# Patient Record
Sex: MALE | Race: WHITE | NOT HISPANIC OR LATINO | Employment: OTHER | URBAN - METROPOLITAN AREA
[De-identification: names, ages, dates, MRNs, and addresses within clinical notes are randomized per-mention and may not be internally consistent; named-entity substitution may affect disease eponyms.]

---

## 2018-11-28 ENCOUNTER — HISTORICAL (OUTPATIENT)
Dept: RADIOLOGY | Facility: HOSPITAL | Age: 58
End: 2018-11-28

## 2019-01-21 ENCOUNTER — HISTORICAL (OUTPATIENT)
Dept: RADIOLOGY | Facility: HOSPITAL | Age: 59
End: 2019-01-21

## 2019-02-01 ENCOUNTER — HISTORICAL (OUTPATIENT)
Dept: RADIOLOGY | Facility: HOSPITAL | Age: 59
End: 2019-02-01

## 2019-02-06 LAB
ABS NEUT (OLG): 5.3 X10(3)/MCL (ref 1.5–6.9)
ALBUMIN SERPL-MCNC: 3.6 GM/DL (ref 3.4–5)
ALBUMIN/GLOB SERPL: 0.9 RATIO
ALP SERPL-CCNC: 78 UNIT/L (ref 30–113)
ALT SERPL-CCNC: 23 UNIT/L (ref 10–45)
APTT PPP: 30.8 SECOND(S) (ref 25–35)
AST SERPL-CCNC: 21 UNIT/L (ref 15–37)
BASOPHILS # BLD AUTO: 0.1 X10(3)/MCL (ref 0–0.1)
BASOPHILS NFR BLD AUTO: 2 % (ref 0–1)
BILIRUB SERPL-MCNC: 0.6 MG/DL (ref 0.1–0.9)
BILIRUBIN DIRECT+TOT PNL SERPL-MCNC: 0.1 MG/DL (ref 0–0.3)
BILIRUBIN DIRECT+TOT PNL SERPL-MCNC: 0.5 MG/DL
BUN SERPL-MCNC: 10 MG/DL (ref 10–20)
CALCIUM SERPL-MCNC: 9.4 MG/DL (ref 8–10.5)
CHLORIDE SERPL-SCNC: 99 MMOL/L (ref 100–108)
CO2 SERPL-SCNC: 32 MMOL/L (ref 21–35)
CREAT SERPL-MCNC: 0.77 MG/DL (ref 0.7–1.3)
EOSINOPHIL # BLD AUTO: 0.3 X10(3)/MCL (ref 0–0.6)
EOSINOPHIL NFR BLD AUTO: 4 % (ref 0–5)
ERYTHROCYTE [DISTWIDTH] IN BLOOD BY AUTOMATED COUNT: 13.2 % (ref 11.5–17)
GLOBULIN SER-MCNC: 3.9 GM/DL
GLUCOSE SERPL-MCNC: 87 MG/DL (ref 75–116)
HCT VFR BLD AUTO: 51.1 % (ref 42–52)
HGB BLD-MCNC: 16.3 GM/DL (ref 14–18)
IMM GRANULOCYTES # BLD AUTO: 0.04 10*3/UL (ref 0–0.02)
IMM GRANULOCYTES NFR BLD AUTO: 0.5 % (ref 0–0.43)
INR PPP: 0.9 (ref 0–1.2)
LYMPHOCYTES # BLD AUTO: 1.3 X10(3)/MCL (ref 0.5–4.1)
LYMPHOCYTES NFR BLD AUTO: 16 % (ref 15–40)
MCH RBC QN AUTO: 30 PG (ref 27–34)
MCHC RBC AUTO-ENTMCNC: 32 GM/DL (ref 31–36)
MCV RBC AUTO: 94 FL (ref 80–99)
MONOCYTES # BLD AUTO: 0.9 X10(3)/MCL (ref 0–1.1)
MONOCYTES NFR BLD AUTO: 12 % (ref 4–12)
NEUTROPHILS # BLD AUTO: 5.3 X10(3)/MCL (ref 1.5–6.9)
NEUTROPHILS NFR BLD AUTO: 66 % (ref 43–75)
PLATELET # BLD AUTO: 224 X10(3)/MCL (ref 140–400)
PMV BLD AUTO: 10 FL (ref 6.8–10)
POTASSIUM SERPL-SCNC: 4.1 MMOL/L (ref 3.6–5.2)
PROT SERPL-MCNC: 7.5 GM/DL (ref 6.4–8.2)
PROTHROMBIN TIME: 9.5 SECOND(S) (ref 9–12)
RBC # BLD AUTO: 5.41 X10(6)/MCL (ref 4.7–6.1)
SODIUM SERPL-SCNC: 137 MMOL/L (ref 135–145)
WBC # SPEC AUTO: 8 X10(3)/MCL (ref 4.5–11.5)

## 2019-02-11 ENCOUNTER — HISTORICAL (OUTPATIENT)
Dept: ANESTHESIOLOGY | Facility: HOSPITAL | Age: 59
End: 2019-02-11

## 2019-03-18 ENCOUNTER — HISTORICAL (OUTPATIENT)
Dept: ANESTHESIOLOGY | Facility: HOSPITAL | Age: 59
End: 2019-03-18

## 2019-04-17 ENCOUNTER — HISTORICAL (OUTPATIENT)
Dept: RADIOLOGY | Facility: HOSPITAL | Age: 59
End: 2019-04-17

## 2019-05-06 ENCOUNTER — HOSPITAL ENCOUNTER (OUTPATIENT)
Dept: MEDSURG UNIT | Facility: HOSPITAL | Age: 59
End: 2019-05-07
Attending: INTERNAL MEDICINE | Admitting: INTERNAL MEDICINE

## 2019-05-06 LAB
ABS NEUT (OLG): 2.3 X10(3)/MCL (ref 1.5–6.9)
ALBUMIN SERPL-MCNC: 3.7 GM/DL (ref 3.4–5)
ALBUMIN/GLOB SERPL: 0.9 RATIO
ALP SERPL-CCNC: 72 UNIT/L (ref 30–113)
ALT SERPL-CCNC: 32 UNIT/L (ref 10–45)
AMPHET UR QL SCN: NORMAL
APPEARANCE, UA: CLEAR
APTT PPP: 31.5 SECOND(S) (ref 25–35)
AST SERPL-CCNC: 40 UNIT/L (ref 15–37)
BACTERIA SPEC CULT: NORMAL /HPF
BARBITURATE SCN PRESENT UR: NORMAL
BASOPHILS # BLD AUTO: 0.1 X10(3)/MCL (ref 0–0.1)
BASOPHILS NFR BLD AUTO: 2 % (ref 0–1)
BENZODIAZ UR QL SCN: NORMAL
BILIRUB SERPL-MCNC: 0.4 MG/DL (ref 0.1–0.9)
BILIRUB UR QL STRIP: NEGATIVE
BILIRUBIN DIRECT+TOT PNL SERPL-MCNC: 0.1 MG/DL (ref 0–0.3)
BILIRUBIN DIRECT+TOT PNL SERPL-MCNC: 0.3 MG/DL
BUN SERPL-MCNC: 8 MG/DL (ref 10–20)
CALCIUM SERPL-MCNC: 8.1 MG/DL (ref 8–10.5)
CANNABINOIDS UR QL SCN: NORMAL
CHLORIDE SERPL-SCNC: 89 MMOL/L (ref 100–108)
CK MB SERPL-MCNC: 3.9 NG/ML (ref 0–3.6)
CK SERPL-CCNC: 208 UNIT/L (ref 39–225)
CO2 SERPL-SCNC: 30 MMOL/L (ref 21–35)
COCAINE UR QL SCN: NORMAL
COLOR UR: ABNORMAL
CREAT SERPL-MCNC: 0.54 MG/DL (ref 0.7–1.3)
EOSINOPHIL # BLD AUTO: 0.1 X10(3)/MCL (ref 0–0.6)
EOSINOPHIL NFR BLD AUTO: 2 % (ref 0–5)
ERYTHROCYTE [DISTWIDTH] IN BLOOD BY AUTOMATED COUNT: 12.8 % (ref 11.5–17)
GLOBULIN SER-MCNC: 3.9 GM/DL
GLUCOSE (UA): NEGATIVE
GLUCOSE SERPL-MCNC: 89 MG/DL (ref 75–116)
HCO3 UR-SCNC: 31 MMOL/L (ref 22–26)
HCO3 UR-SCNC: 31 MMOL/L (ref 22–26)
HCT VFR BLD AUTO: 48.2 % (ref 42–52)
HGB BLD-MCNC: 16.8 GM/DL (ref 14–18)
HGB UR QL STRIP: ABNORMAL
IMM GRANULOCYTES # BLD AUTO: 0.04 10*3/UL (ref 0–0.02)
IMM GRANULOCYTES NFR BLD AUTO: 0.9 % (ref 0–0.43)
INR PPP: 1 (ref 0–1.2)
KETONES UR QL STRIP: ABNORMAL
LACTATE SERPL-SCNC: 2 MMOL/L (ref 0.4–2)
LEUKOCYTE ESTERASE UR QL STRIP: NEGATIVE
LYMPHOCYTES # BLD AUTO: 1.4 X10(3)/MCL (ref 0.5–4.1)
LYMPHOCYTES NFR BLD AUTO: 33 % (ref 15–40)
MAGNESIUM SERPL-MCNC: 1.9 MG/DL (ref 1.8–2.4)
MCH RBC QN AUTO: 31 PG (ref 27–34)
MCHC RBC AUTO-ENTMCNC: 35 GM/DL (ref 31–36)
MCV RBC AUTO: 88 FL (ref 80–99)
MDMA UR QL SCN: NORMAL
METHADONE UR QL SCN: NORMAL
MONOCYTES # BLD AUTO: 0.5 X10(3)/MCL (ref 0–1.1)
MONOCYTES NFR BLD AUTO: 11 % (ref 4–12)
NEUTROPHILS # BLD AUTO: 2.3 X10(3)/MCL (ref 1.5–6.9)
NEUTROPHILS NFR BLD AUTO: 52 % (ref 43–75)
NITRITE UR QL STRIP: NEGATIVE
OPIATES UR QL SCN: NORMAL
PCO2 BLDA: 58.6 MMHG (ref 35–45)
PCO2 BLDA: 58.6 MMHG (ref 35–45)
PCP UR QL: NORMAL
PH SMN: 7.33 [PH] (ref 7.35–7.45)
PH SMN: 7.33 [PH] (ref 7.35–7.45)
PH UR STRIP.AUTO: 6 [PH] (ref 5–8)
PH UR STRIP: 6 [PH]
PLATELET # BLD AUTO: 184 X10(3)/MCL (ref 140–400)
PMV BLD AUTO: 9.1 FL (ref 6.8–10)
PO2 BLDA: 68 MMHG (ref 80–100)
PO2 BLDA: 68 MMHG (ref 80–100)
POC ALLENS TEST: POSITIVE
POC BE: 5 (ref -2–3)
POC BE: 5 MMOL/L (ref -2–3)
POC CO2: 33 MMOL/L (ref 22–27)
POC SATURATED O2: 91 % (ref 96–97)
POC SATURATED O2: 91 MMHG (ref 96–97)
POC SITE: ABNORMAL
POC TCO2: 33 MMOL/L (ref 24–29)
POC TREATMENT: ABNORMAL
POTASSIUM SERPL-SCNC: 4.7 MMOL/L (ref 3.6–5.2)
PROT SERPL-MCNC: 7.6 GM/DL (ref 6.4–8.2)
PROT UR QL STRIP: ABNORMAL
PROTHROMBIN TIME: 10.4 SECOND(S) (ref 9–12)
RBC # BLD AUTO: 5.46 X10(6)/MCL (ref 4.7–6.1)
RBC #/AREA URNS HPF: NORMAL /HPF
SODIUM SERPL-SCNC: 127 MMOL/L (ref 135–145)
SP GR UR STRIP: <=1.005
SQUAMOUS EPITHELIAL, UA: NORMAL /LPF
TEMPERATURE, URINE (OHS): 23 DEGC (ref 20–25)
TROPONIN I SERPL-MCNC: 0.04 NG/ML (ref 0–0.06)
UROBILINOGEN UR STRIP-ACNC: 0.2 EU/DL
WBC # SPEC AUTO: 4.3 X10(3)/MCL (ref 4.5–11.5)
WBC #/AREA URNS HPF: NORMAL /[HPF]

## 2019-05-07 LAB
CK MB SERPL-MCNC: 3 NG/ML (ref 0–3.6)
CK MB SERPL-MCNC: 3.2 NG/ML (ref 0–3.6)
CK SERPL-CCNC: 140 UNIT/L (ref 39–225)
CK SERPL-CCNC: 154 UNIT/L (ref 39–225)
TROPONIN I SERPL-MCNC: <0.017 NG/ML (ref 0–0.06)
TROPONIN I SERPL-MCNC: <0.017 NG/ML (ref 0–0.06)

## 2019-05-31 ENCOUNTER — HISTORICAL (OUTPATIENT)
Dept: RADIOLOGY | Facility: HOSPITAL | Age: 59
End: 2019-05-31

## 2019-07-29 ENCOUNTER — HISTORICAL (OUTPATIENT)
Dept: RADIOLOGY | Facility: HOSPITAL | Age: 59
End: 2019-07-29

## 2019-08-19 ENCOUNTER — HISTORICAL (OUTPATIENT)
Dept: LAB | Facility: HOSPITAL | Age: 59
End: 2019-08-19

## 2019-08-19 LAB
CHOLEST SERPL-MCNC: 142 MG/DL (ref 140–200)
CHOLEST/HDLC SERPL: 3 MG/DL (ref 0–8)
HDLC SERPL-MCNC: 55 MG/DL (ref 35–59)
LDLC SERPL CALC-MCNC: 81 MG/DL (ref 100–129)
TRIGL SERPL-MCNC: 61 MG/DL (ref 35–150)
VLDLC SERPL CALC-MCNC: 12 MG/DL

## 2019-11-18 ENCOUNTER — HISTORICAL (OUTPATIENT)
Dept: LAB | Facility: HOSPITAL | Age: 59
End: 2019-11-18

## 2019-11-18 LAB
ALBUMIN SERPL-MCNC: 3.6 GM/DL (ref 3.4–5)
ALBUMIN/GLOB SERPL: 1 RATIO
ALP SERPL-CCNC: 82 UNIT/L (ref 30–113)
ALT SERPL-CCNC: 22 UNIT/L (ref 10–45)
AST SERPL-CCNC: 19 UNIT/L (ref 15–37)
BILIRUB SERPL-MCNC: 0.6 MG/DL (ref 0.1–0.9)
BILIRUBIN DIRECT+TOT PNL SERPL-MCNC: 0.1 MG/DL (ref 0–0.3)
BILIRUBIN DIRECT+TOT PNL SERPL-MCNC: 0.5 MG/DL
BUN SERPL-MCNC: 13 MG/DL (ref 10–20)
CALCIUM SERPL-MCNC: 8.8 MG/DL (ref 8–10.5)
CHLORIDE SERPL-SCNC: 103 MMOL/L (ref 100–108)
CHOLEST SERPL-MCNC: 234 MG/DL (ref 140–200)
CHOLEST/HDLC SERPL: 5 MG/DL (ref 0–8)
CO2 SERPL-SCNC: 34 MMOL/L (ref 21–35)
CREAT SERPL-MCNC: 0.8 MG/DL (ref 0.7–1.3)
GLOBULIN SER-MCNC: 3.7 GM/DL
GLUCOSE SERPL-MCNC: 96 MG/DL (ref 75–116)
HDLC SERPL-MCNC: 48 MG/DL (ref 35–59)
LDLC SERPL CALC-MCNC: 168 MG/DL (ref 100–129)
POTASSIUM SERPL-SCNC: 4.3 MMOL/L (ref 3.6–5.2)
PROT SERPL-MCNC: 7.3 GM/DL (ref 6.4–8.2)
SODIUM SERPL-SCNC: 141 MMOL/L (ref 135–145)
TRIGL SERPL-MCNC: 119 MG/DL (ref 35–150)
VLDLC SERPL CALC-MCNC: 24 MG/DL

## 2020-01-09 ENCOUNTER — HISTORICAL (OUTPATIENT)
Dept: LAB | Facility: HOSPITAL | Age: 60
End: 2020-01-09

## 2020-01-09 LAB
ABS NEUT (OLG): 4.4 X10(3)/MCL (ref 1.5–6.9)
ALBUMIN SERPL-MCNC: 3.5 GM/DL (ref 3.4–5)
ALBUMIN/GLOB SERPL: 1 RATIO
ALP SERPL-CCNC: 78 UNIT/L (ref 30–113)
ALT SERPL-CCNC: 34 UNIT/L (ref 10–45)
APPEARANCE, UA: CLEAR
AST SERPL-CCNC: 30 UNIT/L (ref 15–37)
BACTERIA SPEC CULT: NORMAL /HPF
BILIRUB SERPL-MCNC: 0.4 MG/DL (ref 0.1–0.9)
BILIRUB UR QL STRIP: NEGATIVE
BILIRUBIN DIRECT+TOT PNL SERPL-MCNC: 0.1 MG/DL (ref 0–0.3)
BILIRUBIN DIRECT+TOT PNL SERPL-MCNC: 0.3 MG/DL
BUN SERPL-MCNC: 5 MG/DL (ref 10–20)
CALCIUM SERPL-MCNC: 8.5 MG/DL (ref 8–10.5)
CHLORIDE SERPL-SCNC: 101 MMOL/L (ref 100–108)
CHOLEST SERPL-MCNC: 116 MG/DL (ref 140–200)
CHOLEST/HDLC SERPL: 2 MG/DL (ref 0–8)
CO2 SERPL-SCNC: 32 MMOL/L (ref 21–35)
COLOR UR: ABNORMAL
CREAT SERPL-MCNC: 0.65 MG/DL (ref 0.7–1.3)
DEPRECATED CALCIDIOL+CALCIFEROL SERPL-MC: 17.67 NG/ML (ref 30–80)
ERYTHROCYTE [DISTWIDTH] IN BLOOD BY AUTOMATED COUNT: 12.9 % (ref 11.5–17)
GLOBULIN SER-MCNC: 3.6 GM/DL
GLUCOSE (UA): NEGATIVE
GLUCOSE SERPL-MCNC: 80 MG/DL (ref 75–116)
HCT VFR BLD AUTO: 50.1 % (ref 42–52)
HDLC SERPL-MCNC: 50 MG/DL (ref 35–59)
HGB BLD-MCNC: 16.7 GM/DL (ref 14–18)
HGB UR QL STRIP: ABNORMAL
KETONES UR QL STRIP: NEGATIVE
LDLC SERPL CALC-MCNC: 56 MG/DL (ref 100–129)
LEUKOCYTE ESTERASE UR QL STRIP: ABNORMAL
MCH RBC QN AUTO: 31 PG (ref 27–34)
MCHC RBC AUTO-ENTMCNC: 33 GM/DL (ref 31–36)
MCV RBC AUTO: 93 FL (ref 80–99)
NITRITE UR QL STRIP: NEGATIVE
PH UR STRIP: 6 [PH]
PLATELET # BLD AUTO: 273 X10(3)/MCL (ref 140–400)
PMV BLD AUTO: 9.5 FL (ref 6.8–10)
POTASSIUM SERPL-SCNC: 4.5 MMOL/L (ref 3.6–5.2)
PROT SERPL-MCNC: 7.1 GM/DL (ref 6.4–8.2)
PROT UR QL STRIP: NEGATIVE
PSA SERPL-MCNC: 4.7 NG/ML (ref 0–4)
RBC # BLD AUTO: 5.38 X10(6)/MCL (ref 4.7–6.1)
RBC #/AREA URNS HPF: NORMAL /HPF
SODIUM SERPL-SCNC: 139 MMOL/L (ref 135–145)
SP GR UR STRIP: <=1.005
SQUAMOUS EPITHELIAL, UA: NORMAL /LPF
TRIGL SERPL-MCNC: 72 MG/DL (ref 35–150)
TSH SERPL-ACNC: 1.16 MIU/ML (ref 0.36–3.74)
UROBILINOGEN UR STRIP-ACNC: 0.2 EU/DL
VLDLC SERPL CALC-MCNC: 14 MG/DL
WBC # SPEC AUTO: 6.8 X10(3)/MCL (ref 4.5–11.5)
WBC #/AREA URNS HPF: NORMAL /HPF

## 2020-01-11 LAB — FINAL CULTURE: NO GROWTH

## 2020-01-13 LAB
HEMOCCULT SP1 STL QL: NEGATIVE
HEMOCCULT SP2 STL QL: NEGATIVE

## 2020-02-28 ENCOUNTER — HISTORICAL (OUTPATIENT)
Dept: LAB | Facility: HOSPITAL | Age: 60
End: 2020-02-28

## 2020-02-28 LAB
ALBUMIN SERPL-MCNC: 3.1 GM/DL (ref 3.4–5)
ALBUMIN/GLOB SERPL: 0.8 RATIO
ALP SERPL-CCNC: 67 UNIT/L (ref 30–113)
ALT SERPL-CCNC: 28 UNIT/L (ref 10–45)
AST SERPL-CCNC: 24 UNIT/L (ref 15–37)
BILIRUB SERPL-MCNC: 0.4 MG/DL (ref 0.1–0.9)
BILIRUBIN DIRECT+TOT PNL SERPL-MCNC: 0.1 MG/DL (ref 0–0.3)
BILIRUBIN DIRECT+TOT PNL SERPL-MCNC: 0.3 MG/DL
BUN SERPL-MCNC: 11 MG/DL (ref 10–20)
CALCIUM SERPL-MCNC: 8.7 MG/DL (ref 8–10.5)
CHLORIDE SERPL-SCNC: 96 MMOL/L (ref 100–108)
CHOLEST SERPL-MCNC: 153 MG/DL (ref 140–200)
CHOLEST/HDLC SERPL: 4 MG/DL (ref 0–8)
CO2 SERPL-SCNC: 29 MMOL/L (ref 21–35)
CREAT SERPL-MCNC: 0.87 MG/DL (ref 0.7–1.3)
GLOBULIN SER-MCNC: 4.1 GM/DL
GLUCOSE SERPL-MCNC: 91 MG/DL (ref 75–116)
HDLC SERPL-MCNC: 35 MG/DL (ref 35–59)
LDLC SERPL CALC-MCNC: 93 MG/DL (ref 100–129)
POTASSIUM SERPL-SCNC: 3.9 MMOL/L (ref 3.6–5.2)
PROT SERPL-MCNC: 7.2 GM/DL (ref 6.4–8.2)
SODIUM SERPL-SCNC: 135 MMOL/L (ref 135–145)
TRIGL SERPL-MCNC: 75 MG/DL (ref 35–150)
VLDLC SERPL CALC-MCNC: 15 MG/DL

## 2020-08-04 ENCOUNTER — HISTORICAL (OUTPATIENT)
Dept: LAB | Facility: HOSPITAL | Age: 60
End: 2020-08-04

## 2020-08-04 LAB — PSA SERPL-MCNC: 3.11 NG/ML

## 2021-04-28 ENCOUNTER — HISTORICAL (OUTPATIENT)
Dept: RADIOLOGY | Facility: HOSPITAL | Age: 61
End: 2021-04-28

## 2021-09-22 ENCOUNTER — HISTORICAL (OUTPATIENT)
Dept: LAB | Facility: HOSPITAL | Age: 61
End: 2021-09-22

## 2021-09-22 LAB — PSA SERPL-MCNC: 2.62 NG/ML

## 2022-04-10 ENCOUNTER — HISTORICAL (OUTPATIENT)
Dept: ADMINISTRATIVE | Facility: HOSPITAL | Age: 62
End: 2022-04-10

## 2022-04-28 VITALS
DIASTOLIC BLOOD PRESSURE: 94 MMHG | SYSTOLIC BLOOD PRESSURE: 144 MMHG | OXYGEN SATURATION: 96 % | BODY MASS INDEX: 24.27 KG/M2 | HEIGHT: 63 IN | WEIGHT: 137 LBS

## 2022-04-30 NOTE — OP NOTE
ADMITTING DIAGNOSIS:  Dysphagia of solid food for about a year with associated weight loss.    PROCEDURE:  Esophagogastroduodenoscopy with biopsy of the antrum.    POSTOPERATIVE DIAGNOSES:    1. Normal duodenum in all 4 portions and into the jejunum.  2. Normal antrum, fundus, and cardia of the stomach with some gastritis.  Biopsy taken with the cold biopsy forceps.  3. Z-line at 40 cm with a mild gastroesophageal junction stricture that may be the source of his dysphagia.  4. Normal esophagus, cricopharyngeus muscle, and vocal cords.    BRIEF HISTORY:  The patient is a 59-year-old  male with COPD on an inhaler, also has osteoarthritis and smokes 2 packs a day for about 35 years.  The patient had solid-food dysphagia for the last year.  The patient was referred to me for endoscopic evaluation of his upper gastrointestinal tract because of his dysphagia.    DESCRIPTION OF PROCEDURE:  The patient underwent examination of esophagus, stomach, and duodenum with a flexible Olympus scope.  The duodenum was normal in all 4 portions into the jejunum.  The pylorus was intubated without difficulty.  Antrum, fundus, and cardia of the stomach had some gastritis.  There was a Z-line at 40 cm and a mild stricture at the GE junction that may be responsible for his dysphagia-like symptoms of solid food.  The patient had a normal esophagus, cricopharyngeus muscle, and vocal cords my.     My plan will be to discuss with him possible EGD and dilation of the distal esophageal stricture.  The patient will undergo further workup with ultrasound of the abdomen, upper GI with small bowel follow-through, and further evaluation of his vocal cords with Dr. Lockhart __________.       I will check for fungal viral aspergillosis and TB, as well as CMV and RSV via pulmonary clinic and Dr. Reza López.  I appreciate the consultation referral from Dr. Sanders.        BBS/MODL   DD: 02/11/2019 1243   DT: 02/11/2019 1310  Job #  [id number]    cc: Dr. Sanders

## 2022-04-30 NOTE — H&P
Patient:   Andrea Vanessa             MRN: [id number]            FIN: [id number]               Age:   59 years     Sex:  Male     :  1960   Associated Diagnoses:   Chest pain; COPD exacerbation; Tobacco user   Author:   Ana SAVAGE, Tray      Basic Information   Admit information:  SOB with cough CP and wheezing with h/o copd .    Source of history:  Self.    Referral source:  Self.    History limitation:  None.       Chief Complaint   2019 20:43 CDT       Pt states midline chest pain/pressure that began 1 hour PTA. [facility] states room air sat 85% on arrival. 324 asprin and 2 sprays nitro given in route.        Review of Systems   Has per HPI otherwise all systems reviewed and negative.   Constitutional:  Negative.    Eye:  Negative.    Ear/Nose/Mouth/Throat:  Negative.    Respiratory:  Shortness of breath, Cough, Wheezing.    Cardiovascular:       Chest pain: Bilateral.    Gastrointestinal:  Negative.    Genitourinary:  Negative.    Hematology/Lymphatics:  Negative.    Endocrine:  Negative.    Immunologic:  Negative.    Musculoskeletal:  Negative.    Integumentary:  Negative.    Neurologic:  Negative.    Psychiatric:  Negative.    All other systems are negative      Health Status   Allergies:    Allergic Reactions (All)  No Known Allergies,    Allergies (1) Active Reaction  No Known Allergies None Documented   Current medications:  (Selected)   Inpatient Medications  Ordered  DuoNeb 0.5 mg-2.5 mg/3 mL inhalation solution: 3 mL, form: Soln, NEB, q6hr Resp, first dose 19 1:00:00 CDT  ProAir HFA 90 mcg/inh inhalation aerosol with adapter: 1 puff(s), 90 mcg =, form: Inhaler, INH, q6hr PRN for wheezing, first dose 19 0:34:00 CDT  SOLU-Medrol: 60 mg, form: Injection, IV Push, [id number], first dose 19 1:00:00 CDT  Zofran: 4 mg, form: Injection, IV Push, q4hr PRN for nausea, first dose 19 0:07:00 CDT, choose first if ordered with other treatments for nausea  acetaminophen: 1,000  mg, form: Tab, Oral, q6hr PRN for pain, first dose 05/07/19 0:07:00 CDT, mild/moderate  acetaminophen: 650 mg, form: Tab, Oral, q6hr PRN for fever, first dose 05/07/19 0:07:00 CDT, > 38.1 degrees Celsius  albuterol-ipratropium MDI inhalation aerosol with adapter: 2 puff(s), form: Inhaler, INH, QID, first dose 05/07/19 0:34:00 CDT  enalapril-hydrochlorothiazide 10 mg-25 mg oral tablet: 1 tab(s), form: Tab, Oral, Daily, first dose 05/07/19 0:35:00 CDT  famotidine 20 mg oral tablet: 20 mg, form: Tab, Oral, BID, first dose 05/07/19 9:00:00 CDT  levofloxacin IVPB ( Levaquin ): 500 mg, form: Infusion, IV, q24hr, Infuse over: 1 hr, first dose 05/07/19 1:00:00 CDT  Prescriptions  Prescribed  ProAir HFA 90 mcg/inh inhalation aerosol with adapter: 1 puff(s), INH, q6hr, PRN PRN for wheezing, # 1 EA, 0 Refill(s)  Documented Medications  Documented  ADVAIR DISKU /50: 1 puff(s), INH, Q AM  famotidine 20 mg oral tablet:   hydrochlorothiazide-lisinopril 12.5 mg-10 mg oral tablet: 1 tab(s), Oral, Daily  omeprazole 40 mg oral DR capsule: 40 mg = 1 cap(s), Oral, Daily  sucralfate 1 g oral tablet: 1 gm = 1 tab(s), Oral, TID,    Medications (10) Active  Scheduled: (6)  albuterol-ipratropium  2 puff(s), INH, QID  albuterol-ipratropium 3mg-0.5 mg/3 mL Sol  3 mL, NEB, q6hr Resp  enalapril-hydrochlorothiazide  1 tab(s), Oral, Daily  famotidine 20 mg Tab UD  20 mg 1 tab(s), Oral, BID  levofloxacin 500 mg/100 mL  500 mg 100 mL, IV, q24hr  methylPREDNISolone 40 mg Inj (for Solu Medrol)  60 mg 1.5 mL, IV Push, [id number]  Continuous: (0)  PRN: (4)  acetaminophen 325 mg Tab  650 mg 2 tab(s), Oral, q6hr  acetaminophen 500 mg Tab  1,000 mg 2 tab(s), Oral, q6hr  albuterol CFC free 90 mcg/inh Aero  90 mcg 1 puff(s), INH, q6hr  ondansetron 2 mg/mL inj - 2mL  4 mg 2 mL, IV Push, q4hr   Problem list:    All Problems  Tobacco user / SNOMED CT 743324005 / Probable,    Active Problems (1)  Tobacco user       Histories   Past Medical History:     Resolved  COPD (chronic obstructive pulmonary disease) ([id number]):  Resolved.   Family History:    Heart disease  Father  Diabetes mellitus type 2  Mother  Acute myocardial infarction.  Father  Mother     Procedure history:    EGD & Esophegeal Dilation (None) on 3/18/2019 at 59 Years.  Comments:  3/18/2019 16:09 - Opal Silva  auto-populated from documented surgical case  Biopsy Gastrointestinal on 3/18/2019 at 59 Years.  Comments:  3/18/2019 16:09 - Opal Silva  auto-populated from documented surgical case  Esophagogastroduodenoscopy on 2/11/2019 at 59 Years.  Comments:  2/11/2019 12:41 - Opal Silva  auto-populated from documented surgical case  Biopsy Gastrointestinal on 2/11/2019 at 59 Years.  Comments:  2/11/2019 12:41 - Opal Silva  auto-populated from documented surgical case  Bilat Ears.  Right hand ([id number]).  Tonsils and adenoids ([id number]).   Social History        Social & Psychosocial Habits    Alcohol  02/06/2019  Use: Current    Type: Beer    Frequency: 1-2 times per week    Employment/School  02/06/2019  Description: Disabled    Home/Environment  02/06/2019  Lives with: Alone    Sexual  02/06/2019  Do you think of your sexual orientation as: Straight or heterosexual    Substance Abuse  03/10/2017  Use: Never    Tobacco  05/06/2019  Use: 10 or more cigarettes (1/    Type: Cigarettes    Patient Wants Consult For Cessation Counseling No    Tobacco use per day: 20.        Physical Examination   Vital Signs   5/7/2019 0:00 CDT        Temperature Oral          36.5 DegC                             Temperature Oral (calculated)             97.70 DegF                             Heart Rate Monitored      79 bpm                             Respiratory Rate          20 br/min                             SpO2                      95 %                             Systolic Blood Pressure   157 mmHg  HI                             Diastolic Blood Pressure   85 mmHg                             Mean Arterial Pressure, Cuff              109 mmHg    5/6/2019 23:42 CDT       Heart Rate Monitored      81 bpm                             Respiratory Rate          20 br/min                             SpO2                      92 %  LOW                             Oxygen Therapy            Nasal cannula                             Oxygen Flow Rate          3 L/min                             Systolic Blood Pressure   134 mmHg                             Diastolic Blood Pressure  89 mmHg    5/6/2019 23:25 CDT       Heart Rate Monitored      76 bpm                             Respiratory Rate          18 br/min                             SpO2                      92 %  LOW                             Oxygen Therapy            Nasal cannula                             Oxygen Flow Rate          3 L/min                             Systolic Blood Pressure   135 mmHg                             Diastolic Blood Pressure  76 mmHg                             Mean Arterial Pressure, Cuff              96 mmHg    5/6/2019 23:19 CDT       Heart Rate Monitored      75 bpm                             Respiratory Rate          14 br/min                             SpO2                      93 %  LOW                             Oxygen Therapy            Nasal cannula                             Oxygen Flow Rate          2 L/min    5/6/2019 22:45 CDT       Heart Rate Monitored      80 bpm                             Respiratory Rate          18 br/min                             SpO2                      92 %  LOW                             Oxygen Therapy            Nasal cannula                             Systolic Blood Pressure   122 mmHg                             Diastolic Blood Pressure  86 mmHg    5/6/2019 21:33 CDT       Heart Rate Monitored      98 bpm                             Respiratory Rate          22 br/min                             SpO2                      92 %  LOW                              Oxygen Therapy            Nasal cannula                             Oxygen Flow Rate          2 L/min                             Systolic Blood Pressure   135 mmHg                             Diastolic Blood Pressure  82 mmHg    5/6/2019 21:00 CDT       Heart Rate Monitored      93 bpm                             Respiratory Rate          24 br/min                             SpO2                      92 %  LOW    5/6/2019 20:54 CDT       Peripheral Pulse Rate     101 bpm  HI                             Respiratory Rate          24 br/min                             SpO2                      92 %  LOW                             Oxygen Therapy            Nasal cannula                             Oxygen Flow Rate          2 L/min                             Systolic Blood Pressure   143 mmHg  HI                             Diastolic Blood Pressure  111 mmHg  HI                             Mean Arterial Pressure, Cuff              122 mmHg    5/6/2019 20:50 CDT       Heart Rate Monitored      97 bpm                             Respiratory Rate          22 br/min                             SpO2                      91 %  LOW                             Oxygen Therapy            Nasal cannula                             Oxygen Flow Rate          2 L/min    5/6/2019 20:43 CDT       Temperature Temporal Artery               36.1 DegC  LOW                             Peripheral Pulse Rate     106 bpm  HI                             Respiratory Rate          24 br/min                             SpO2                      92 %  LOW                             Oxygen Therapy            Nasal cannula                             Oxygen Flow Rate          2 L/min                             Systolic Blood Pressure   144 mmHg  HI                             Diastolic Blood Pressure  103 mmHg  HI        Vital Signs (last 24 hrs)_____  Last Charted___________  Temp Oral     36.5 DegC  (MAY 07 00:00)  Heart  Rate Peripheral   H 101bpm  (MAY 06 20:54)  Resp Rate         20 br/min  (MAY 07 00:00)  SBP      H 157mmHg  (MAY 07 00:00)  DBP      85 mmHg  (MAY 07 00:00)  SpO2      95 %  (MAY 07 00:00)  Weight      65 kg  (MAY 06 20:43)  Height      170 cm  (MAY 06 20:43)  BMI      22.49  (MAY 06 20:43)   Intake and Output   Fluid Balance Primitives   3/18/2019 17:27 CDT      Urine Count               1    2/11/2019 7:00 CST       Oral Intake               360 mL                             Urine Count               1        General:  Alert and oriented, No acute distress.    Eye:  Pupils are equal, round and reactive to light, Normal conjunctiva.    HENT:  Normocephalic, Normal hearing, Oral mucosa is moist.    Neck:  Supple, Non-tender, No carotid bruit.    Respiratory:  Lungs are clear to auscultation, Respirations are non-labored, Breath sounds are equal.    Cardiovascular:  Normal rate, Regular rhythm, No murmur.    Gastrointestinal:  Soft, Non-tender, Non-distended, Normal bowel sounds.    Genitourinary:  No costovertebral angle tenderness.    Lymphatics:  No lymphadenopathy neck, axilla, groin.    Musculoskeletal:  Normal range of motion, Normal strength, No tenderness, No swelling.    Integumentary:  Warm, Dry, Intact.    Neurologic:  Alert, Oriented, Normal sensory, No focal deficits.    Cognition and Speech:  Oriented, Speech clear and coherent, Functional cognition intact.    Psychiatric:  Cooperative, Appropriate mood & affect, Normal judgment.       Review / Management   Laboratory Results   Today's Lab Results : PowerNote Discrete Results   5/6/2019 21:16 CDT       POC pH                    7.332  LOW                             POC pCO2                  58.6 mmHg  CRIT                             POC pO2                   68.0 mmHg  LOW                             POC HCO3                  31.0 mmol/L  HI                             POC TCO2                  33.0 mmol/L  HI                             POC sO2                    91.0 %  LOW                             POC BE                    5 mmol/L  HI    5/6/2019 21:05 CDT       Treatment                 2 lpm nc                             Site                      Radial Rt                             pH Art                    7.332  LOW                             pCO2 Art                  58.6 mmHg  CRIT                             pO2 Art                   68.0 mmHg  LOW                             HCO3 Art                  31.0 mmol/L  HI                             CO2 Totl Art              33.0 mmol/L  HI                             O2 Sat Art                91.0 mmHg  LOW                             D base                    5.0  HI                             Allens                    Positive    5/6/2019 21:00 CDT       Lactic Acid Lvl           2.0 mmol/L    5/6/2019 20:48 CDT       WBC                       4.3 x10(3)/mcL  LOW                             RBC                       5.46 x10(6)/mcL                             Hgb                       16.8 gm/dL                             Hct                       48.2 %                             Platelet                  184 x10(3)/mcL                             MCV                       88 fL                             MCH                       31 pg                             MCHC                      35 gm/dL                             RDW                       12.8 %                             MPV                       9.1 fL                             Abs Neut                  2.3 x10(3)/mcL                             Neutro Auto               52 %                             Lymph Auto                33 %                             Mono Auto                 11 %                             Eos Auto                  2 %                             Abs Eos                   0.1 x10(3)/mcL                             Basophil Auto             2 %  HI                             Abs Neutro                 2.3 x10(3)/mcL                             Abs Lymph                 1.4 x10(3)/mcL                             Abs Mono                  0.5 x10(3)/mcL                             Abs Baso                  0.1 x10(3)/mcL                             IG%                       0.900 %  HI                             IG#                       0.0400  HI                             PT                        10.4 second(s)                             INR                       1.0                             PTT                       31.5 second(s)                             UA Appear                 CLEAR                             UA Color                  STRAW                             UA Spec Grav              <=1.005                             UA Bili                   Negative                             UA pH                     6.0  NA                             UA Urobilinogen           0.2 EU/dL                             UA Blood                  SMALL                             UA Glucose                Negative                             UA Ketones                Trace                             UA Protein                Trace                             UA Nitrite                Negative                             UA Leuk Est               Negative                             UA WBC                    None Seen                             UA RBC                    Rare /HPF                             UA Bacteria               None Seen /HPF                             UA Squam Epithelial       Rare /LPF                             U Temp                    23.0 DegC                             Sodium Lvl                127 mmol/L  LOW                             Potassium Lvl             4.7 mmol/L                             Chloride                  89 mmol/L  LOW                             CO2                       30 mmol/L                             Calcium Lvl               8.1  mg/dL                             Magnesium Lvl             1.9 mg/dL                             Glucose Lvl               89 mg/dL                             BUN                       8 mg/dL  LOW                             Creatinine                0.54 mg/dL  LOW                             eGFR-AA                   >60 mL/min/1.73 m2  NA                             eGFR-DEBO                  >60 mL/min/1.73 m2  NA                             Bili Total                0.4 mg/dL                             Bili Direct               0.10 mg/dL                             Bili Indirect             0.30 mg/dL  NA                             AST                       40 unit/L  HI                             ALT                       32 unit/L                             Alk Phos                  72 unit/L                             Total Protein             7.6 gm/dL                             Albumin Lvl               3.7 gm/dL                             Globulin                  3.90 gm/dL  NA                             A/G Ratio                 0.9 ratio  NA                             NT pro BNP.               619 pg/mL  HI                             Total CK                  208 unit/L                             CK MB                     3.9 ng/mL  HI                             Troponin-I                0.035 ng/mL                             U pH                      6.0                             U Amph Scr                NEG.                             U Karen Scr                NEG.                             U Benzodia Scr            NEG.                             U Cannab Scr              NEG.                             U Cocaine Scr             NEG.                             U Opiate Scr              NEG.                             U Phencyclidine Scr       NEG.                             U Methadone               NEG.                             U MDMA Scr                NEG.         Radiology results   X-ray   Condition:  Guarded.       Impression and Plan   Diagnosis     Chest pain (PNED [id number]).     COPD exacerbation ([id number]-MH J44.1).     Tobacco user ([id number] Z72.0).     Course:  Progressing as expected.    Orders      (Selected)   Inpatient Orders  Ordered  Admission Assessment Adult: 05/06/19 23:47:35 CDT, Stop date 05/06/19 23:47:35 CDT  Admission History Adult: 05/06/19 23:47:35 CDT, Stop date 05/06/19 23:47:35 CDT  Admit as Inpatient: 05/06/19 23:31:00 CDT, Telemetry with Monitor Ana SAVAGE, [facility], No  Aerosol Treatment: 05/07/19 1:00:00 CDT, [id number]  Apply SCDs: 05/07/19 0:07:29 CDT, Once, Stop date 05/07/19 0:07:29 CDT  Basic Admission Information Adult: 05/06/19 23:47:35 CDT, Stop date 05/06/19 23:47:35 CDT  Bedrest with Bathroom Privileges: 05/07/19 0:07:00 CDT, Stop date 05/07/19 0:07:00 CDT  Below the Knee Intermittent Pneumatic Compression Device: 05/07/19 0:07:00 CDT, Constant Order  CK MB: Timed collect, 05/07/19 4:00:00 CDT, Blood, q6hr for 3 dose(s), Stop date 05/07/19 21:59:00 CDT, Lab Collect, 05/07/19 4:00:00 CDT  CPK: Timed collect, 05/07/19 4:00:00 CDT, Blood, q6hr for 3 dose(s), Stop date 05/07/19 21:59:00 CDT, Lab Collect, 05/07/19 4:00:00 CDT  Capacity Management Bed Request: 05/06/19 23:33:47 CDT, Telemetry with Monitor  DuoNeb 0.5 mg-2.5 mg/3 mL inhalation solution: 3 mL, form: Soln, NEB, q6hr Resp, first dose 05/07/19 1:00:00 CDT  Immunizations Quality Measures: 05/06/19 23:47:35 CDT, qShift  Low Sodium Meal Plan: 05/07/19 0:07:00 CDT, Start Meal: Next Meal  Notify Provider Vital Signs: 05/07/19 0:07:00 CDT, HR > 135, HR < 55, SBP > 190, SBP < 90, RR > 28, UO < 200mL in 8hr  Of chest pain [AFTER STAT: 1) EKG,  2) Pulse Ox, 3) Vital Signs]: 05/07/19 0:07:00 CDT, Of chest pain [AFTER STAT: 1) EKG,  2) Pulse Ox, 3) Vital Signs]  Oxygen Therapy: 05/07/19 0:23:00 CDT, 2, Nasal Cannula, CM Oxygen  ProAir HFA 90 mcg/inh inhalation  aerosol with adapter: 1 puff(s), 90 mcg =, form: Inhaler, INH, q6hr PRN for wheezing, first dose 19 0:34:00 CDT  Pulse Oximetry: 19 0:23:00 CDT, PRN, KEEP SATS >90%  Remove Compression Device, Inspect skin, Reapply, and Document Assessment: 19 0:07:00 CDT, qShift  SOLU-Medrol: 60 mg, form: Injection, IV Push, [id number], first dose 19 1:00:00 CDT  Scd Pump  [id number]:   Telemetry Monitorin19 0:07:00 CDT, Stop date 19 0:07:00 CDT, CM Telemetry Monitoring  Troponin-I: Timed collect, 19 4:00:00 CDT, Blood, q6hr for 3 dose(s), Stop date 19 21:59:00 CDT, Lab Collect, 19 4:00:00 CDT  VTE Quality Measures: 19 23:47:35 CDT, qShift  Vital Signs: 19 0:07:00 CDT, q4hr  Zofran: 4 mg, form: Injection, IV Push, q4hr PRN for nausea, first dose 19 0:07:00 CDT, choose first if ordered with other treatments for nausea  acetaminophen: 1,000 mg, form: Tab, Oral, q6hr PRN for pain, first dose 19 0:07:00 CDT, mild/moderate  acetaminophen: 650 mg, form: Tab, Oral, q6hr PRN for fever, first dose 19 0:07:00 CDT, > 38.1 degrees Celsius  albuterol-ipratropium MDI inhalation aerosol with adapter: 2 puff(s), form: Inhaler, INH, QID, first dose 19 0:34:00 CDT  enalapril-hydrochlorothiazide 10 mg-25 mg oral tablet: 1 tab(s), form: Tab, Oral, Daily, first dose 19 0:35:00 CDT  famotidine 20 mg oral tablet: 20 mg, form: Tab, Oral, BID, first dose 19 9:00:00 CDT  levofloxacin IVPB ( Levaquin ): 500 mg, form: Infusion, IV, q24hr, Infuse over: 1 hr, first dose 19 1:00:00 CDT  of patients room number and as needed with any acute change or worsening condition: 19 0:07:00 CDT, of patients room number and as needed with any acute change or worsening condition  Ordered (Exam Completed)  XR Chest 1 View: Stat, 19 20:47:00 CDT, Chest Pain, None, Stretcher, Rad Type, Not Scheduled, 19 20:47:00 CDT.     Education and Follow-up:           Counseled: Patient, Regarding diagnosis, Regarding treatment, Regarding medications.         Discharge Planning: Plan to discharge ( To home ), Chronic Obstructive Pulmonary Disease Exacerbation, Easy-to-Read, Chronic Obstructive Pulmonary Disease.       Quality Measures

## 2022-04-30 NOTE — ED PROVIDER NOTES
Patient:   Andrea Vanessa             MRN: [id number]            FIN: [id number]               Age:   59 years     Sex:  Male     :  1960   Associated Diagnoses:   COPD exacerbation   Author:   Marques Vides MD      Basic Information   Time seen: Date & time 2019 20:39:00.   History source: Patient, EMS.   Arrival mode: Ambulance.   History limitation: None.      History of Present Illness   The patient presents with Chest pain, shortness of breath, diaphoresis onset 1 hour prior to arrival chest pain is substernal and pleuritic.  Patient states his shortness of breath has been going on for several days but got worse in the last 1-2 hours.  The onset was 1  hours ago.  The course/duration of symptoms is constant.  Location: substernal. Radiating pain: none. The character of symptoms is pressure.  The degree at onset was moderate.  The degree at maximum was moderate.  The degree at present is moderate.  There are exacerbating factors including breathing and coughing.  The relieving factor is none.  Risk factors consist of hypertension, smoking and COPD.  Prior episodes: none.  Therapy today Patient states he has been using an albuterol inhaler at home.  Associated symptoms: shortness of breath.        Review of Systems   Constitutional symptoms:  Negative except as documented in HPI.   Skin symptoms:  Negative except as documented in HPI.   Eye symptoms:  Negative except as documented in HPI.   ENMT symptoms:  Negative except as documented in HPI.   Respiratory symptoms:  Negative except as documented in HPI.   Cardiovascular symptoms:  Negative except as documented in HPI.   Gastrointestinal symptoms:  Negative except as documented in HPI.   Genitourinary symptoms:  Negative except as documented in HPI.   Musculoskeletal symptoms:  Negative except as documented in HPI.   Neurologic symptoms:  Negative except as documented in HPI.   Psychiatric symptoms:  Negative except as documented in HPI.              Additional review of systems information: All other systems reviewed and otherwise negative.      Health Status   Allergies:    Allergic Reactions (Selected)  No Known Allergies,    Allergies (1) Active Reaction  No Known Allergies None Documented.   Medications:  (Selected)   Prescriptions  Prescribed  ProAir HFA 90 mcg/inh inhalation aerosol with adapter: 1 puff(s), INH, q6hr, PRN PRN for wheezing, # 1 EA, 0 Refill(s)  Documented Medications  Documented  ADVAIR DISKU /50: 1 puff(s), INH, Q AM  azithromycin 250 mg oral tablet:   clindamycin 300 mg oral capsule: 300 mg = 1 cap(s), Oral, TID  doxycycline monohydrate 100 mg oral capsule: 100 mg = 1 cap(s), Oral, BID  famotidine 20 mg oral tablet:   omeprazole 40 mg oral DR capsule: 40 mg = 1 cap(s), Oral, Daily  sucralfate 1 g oral tablet: 1 gm = 1 tab(s), Oral, TID.      Past Medical/ Family/ Social History   Medical history:    Resolved  COPD (chronic obstructive pulmonary disease) ([id number]):  Resolved..   Surgical history:    EGD & Esophegeal Dilation (None) on 3/18/2019 at 59 Years.  Comments:  3/18/2019 16:09 - Opal Silva  auto-populated from documented surgical case  Biopsy Gastrointestinal on 3/18/2019 at 59 Years.  Comments:  3/18/2019 16:09 - Opal Silva  auto-populated from documented surgical case  Esophagogastroduodenoscopy on 2/11/2019 at 59 Years.  Comments:  2/11/2019 12:41 - Opal Silva  auto-populated from documented surgical case  Biopsy Gastrointestinal on 2/11/2019 at 59 Years.  Comments:  2/11/2019 12:41 - Opal Silva  auto-populated from documented surgical case  Bilat Ears.  Right hand ([id number]).  Tonsils and adenoids ([id number])..   Family history:    Heart disease  Father  Diabetes mellitus type 2  Mother  Acute myocardial infarction.  Father  Mother  .   Social history: Tobacco use: Regularly.   Problem list:    Active Problems (1)  Tobacco user .      Physical  Examination               Vital Signs      No qualifying data available.   Oxygen saturation.   General:  Alert, no acute distress.    Skin:  Warm, dry, pink, intact.    Head:  Normocephalic, atraumatic.    Neck:  Supple, trachea midline, no tenderness, no JVD.    Eye:  Pupils are equal, round and reactive to light, extraocular movements are intact, normal conjunctiva.    Ears, nose, mouth and throat:  Tympanic membranes clear.   Cardiovascular:  Regular rate and rhythm, No murmur, Normal peripheral perfusion, No edema.    Respiratory:  Breath sounds are equal, Respirations: Tachypneic, Breath sounds: Bilateral, base(s), diminished, wheezes present (moderate, expiratory wheezes).    Chest wall:  No tenderness, No deformity.    Back:  Nontender, Normal range of motion, Normal alignment.    Musculoskeletal:  Normal ROM, normal strength, no tenderness, no swelling, no deformity.    Gastrointestinal:  Soft, Nontender, Non distended, Normal bowel sounds, No organomegaly.    Neurological:  Alert and oriented to person, place, time, and situation, No focal neurological deficit observed.    Lymphatics:  No lymphadenopathy.   Psychiatric:  Cooperative, appropriate mood & affect, normal judgment.       Medical Decision Making   Orders  Launch Orders   Laboratory:  ABG Request POC (Order): Stat collect, Arterial Blood, 5/6/2019 20:47 CDT, Once, Stop date 5/6/2019 20:47 CDT  BNP-Pro (Order): Stat collect, 5/6/2019 20:47 CDT, Blood, Lab Collect, 5/6/2019 20:47 CDT  Lactic Acid (Order): Stat collect, 5/6/2019 20:47 CDT, Blood, Lab Collect, 5/6/2019 20:47 CDT  Drug Screen Urine [facility] (Order): Stat collect, Urine, 5/6/2019 20:47 CDT, Nurse collect  Urinalysis with Microscopic if Indicated (Order): Stat collect, Urine, 5/6/2019 20:47 CDT, Nurse collect  Magnesium Level (Order): Stat collect, 5/6/2019 20:47 CDT, Blood, Lab Collect, 5/6/2019 20:47 CDT  Troponin-I (Order): Stat collect, 5/6/2019 20:47 CDT, Blood, Lab Collect, 5/6/2019  20:47 CDT  CK MB (Order): Stat collect, 5/6/2019 20:47 CDT, Blood, Lab Collect, 5/6/2019 20:47 CDT  CK (Order): Stat collect, 5/6/2019 20:47 CDT, Blood, Lab Collect, 5/6/2019 20:47 CDT  PTT (Order): Stat collect, 5/6/2019 20:47 CDT, Blood, Lab Collect, 5/6/2019 20:47 CDT  INR - Protime (Order): Stat collect, 5/6/2019 20:47 CDT, Blood, Lab Collect, 5/6/2019 20:47 CDT  CMP (Order): Stat collect, 5/6/2019 20:47 CDT, Blood, Lab Collect, 5/6/2019 20:47 CDT  CBC w/ Auto Diff (Order): Now collect, 5/6/2019 20:47 CDT, Blood, Lab Collect, 5/6/2019 20:47 CDT  Pharmacy:  SOLU-Medrol (Order): 125 mg, form: Injection, IV Push, Once, first dose 5/6/2019 20:48 CDT, stop date 5/6/2019 20:48 CDT, STAT  DuoNeb (Order): 3 mL, form: Soln, NEB, Once, first dose 5/6/2019 20:48 CDT, stop date 5/6/2019 20:48 CDT  Radiology:  XR Chest 1 View (Order): Stat, 5/6/2019 20:47 CDT, Chest Pain, None, Stretcher, Rad Type, Not Scheduled  Cardiology:  EKG Adult 12 Lead (Order): 5/6/2019 20:47 CDT, Stat, -1, -1, 5/6/2019 20:47 CDT.   Electrocardiogram:  Time 5/6/2019 20:46:00, rate 102, no ectopy, The Rhythm is sinus tachycardia.  , Q waves V1 and V2.    Results review:  Lab results : Lab View   5/6/2019 21:16 CDT       POC pH                    7.332  LOW                             POC pCO2                  58.6 mmHg  CRIT                             POC pO2                   68.0 mmHg  LOW                             POC HCO3                  31.0 mmol/L  HI                             POC TCO2                  33.0 mmol/L  HI                             POC sO2                   91.0 %  LOW                             POC BE                    5 mmol/L  HI    5/6/2019 21:05 CDT       Treatment                 2 lpm nc                             Site                      Radial Rt                             pH Art                    7.332  LOW                             pCO2 Art                  58.6 mmHg  CRIT                             pO2  Art                   68.0 mmHg  LOW                             HCO3 Art                  31.0 mmol/L  HI                             CO2 Totl Art              33.0 mmol/L  HI                             O2 Sat Art                91.0 mmHg  LOW                             D base                    5.0  HI                             Allens                    Positive    5/6/2019 21:00 CDT       Lactic Acid Lvl           2.0 mmol/L    5/6/2019 20:48 CDT       Sodium Lvl                127 mmol/L  LOW                             Potassium Lvl             4.7 mmol/L                             Chloride                  89 mmol/L  LOW                             CO2                       30 mmol/L                             Calcium Lvl               8.1 mg/dL                             Magnesium Lvl             1.9 mg/dL                             Glucose Lvl               89 mg/dL                             BUN                       8 mg/dL  LOW                             Creatinine                0.54 mg/dL  LOW                             eGFR-AA                   >60 mL/min/1.73 m2  NA                             eGFR-DEBO                  >60 mL/min/1.73 m2  NA                             Bili Total                0.4 mg/dL                             Bili Direct               0.10 mg/dL                             Bili Indirect             0.30 mg/dL  NA                             AST                       40 unit/L  HI                             ALT                       32 unit/L                             Alk Phos                  72 unit/L                             Total Protein             7.6 gm/dL                             Albumin Lvl               3.7 gm/dL                             Globulin                  3.90 gm/dL  NA                             A/G Ratio                 0.9 ratio  NA                             NT pro BNP.               619 pg/mL  HI                             Total CK                   208 unit/L                             CK MB                     3.9 ng/mL  HI                             Troponin-I                0.035 ng/mL                             PT                        10.4 second(s)                             INR                       1.0                             PTT                       31.5 second(s)                             WBC                       4.3 x10(3)/mcL  LOW                             RBC                       5.46 x10(6)/mcL                             Hgb                       16.8 gm/dL                             Hct                       48.2 %                             Platelet                  184 x10(3)/mcL                             MCV                       88 fL                             MCH                       31 pg                             MCHC                      35 gm/dL                             RDW                       12.8 %                             MPV                       9.1 fL                             Abs Neut                  2.3 x10(3)/mcL                             Neutro Auto               52 %                             Lymph Auto                33 %                             Mono Auto                 11 %                             Eos Auto                  2 %                             Abs Eos                   0.1 x10(3)/mcL                             Basophil Auto             2 %  HI                             Abs Neutro                2.3 x10(3)/mcL                             Abs Lymph                 1.4 x10(3)/mcL                             Abs Mono                  0.5 x10(3)/mcL                             Abs Baso                  0.1 x10(3)/mcL                             IG%                       0.900 %  HI                             IG#                       0.0400  HI  ,    No qualifying data available.       Reexamination/ Reevaluation   Time: 5/6/2019 23:00:00 .       Impression and Plan   Diagnosis   COPD exacerbation (DES61-EF J44.1)      Calls-Consults   -  5/6/2019 23:30:00 , Ana SAVAGE, Olga Feliz to admit.    Plan   Condition: Improved, Stable.    Disposition: Admit time  5/6/2019 23:31:00, Admit to Inpatient Telemetry Unit.    Counseled: Patient, Regarding diagnosis, Regarding diagnostic results, Regarding treatment plan, Patient indicated understanding of instructions.    Orders

## 2022-04-30 NOTE — OP NOTE
ADMITTING DIAGNOSIS:  Gastroesophageal stricture diagnosed on 02/11/2019.    PROCEDURE:  Esophagogastroduodenoscopy with dilation over a guidewire using fluoroscopy and a 54-[place]aur[place] bougie.    BRIEF HISTORY:  Patient is a 59-year-old  male with a history of COPD, on an inhaler, smokes 2 packs a day for 40 years.  Patient has osteoarthritis and had a history of dysphagia for the last year, treated with an EGD that demonstrated stricture of the esophagus at the GE junction.  On 02/11/2019, patient underwent biopsy and was found to have chronic antral gastritis with H. pylori.  Patient, in addition to this, was referred for EGD with dilatation and then consideration for possible laparoscopic Nissen fundoplication.    DESCRIPTION OF PROCEDURE:  Patient underwent examination of the esophagus, stomach, and duodenum under sedation with a flexible Olympus scope.     Duodenum was normal in all 4 portions and into the jejunum.  There was definite gastritis of the antrum, fundus, and cardia of the stomach, mostly in the antrum.  A biopsy with cold biopsy forceps was taken for histopathology and H. pylori.  Patient did have a Z-line at 40 cm with a stricture of the distal esophagus that was dilated over 54-[place]aur[place]ian bougie over a guidewire with associated fluoroscopy.  Patient's esophagus otherwise was within normal limits.  There was definitely some evidence of gastroesophageal esophagitis that was consistent with a stricture.  I will discuss with him possible Nissen fundoplication given his presence of a hiatal hernia and reflux.        BBS/MODL   DD: 03/18/2019 1629   DT: 03/18/2019 1653  Job # [id number]    cc: Willem Sanders MD

## 2022-05-03 NOTE — HISTORICAL [facility]ER
This is a historical note converted from [employer]. Formatting and pictures may have been removed.  Please reference Maykel for original formatting and attached multimedia. Chief Complaint  Pt states midline chest pain/pressure that began 1 hour PTA. [facility] states room air sat 85% on arrival. 324 asprin and 2 sprays nitro given in route.  History of Present Illness  60yo male presents to the ED c/o SOB with some associated chest pain.? He states that his SOB started about 3 days ago and has gotten progressively worse. He also has a dry cough. He was using his inhaler at home with no help.? He does have COPD?and continues to smoke.? No fever/chills.? No N/V/D/C.? He was mildly hypoxic at 91% upon admit.? CXR did not show any infiltrates.? He will be admitted for COPD exacerbation.  Review of Systems  ?  ?????Constitutional: ?No fever, No chills, No sweats, No weakness, No fatigue. ?  ?????Eye: ?No double vision, No dry eyes, No photophobia. ?  ?????Ear/Nose/Mouth/Throat: ?No ear pain, No nasal congestion, No sore throat. ?  ?????Respiratory:??+ shortness of breath,?+ cough, No sputum production, No hemoptysis, No wheezing, No pleuritic pain. ?  ?????Cardiovascular: ?No chest pain, No palpitations, No peripheral edema, No syncope. ?  ?????Gastrointestinal: ?No nausea, No vomiting, No diarrhea, No constipation, No heartburn, No abdominal pain. ?  ?????Genitourinary: ?No dysuria, No hematuria, No change in urine stream. ?  ?????Hematology/Lymphatics: ?No anemia, No bruising tendency, No bruise, No hemorrhage, No petechiae. ?  ?????Endocrine: ?No excessive thirst, No polyuria, No cold intolerance. ?  ?????Immunologic: ?No recurrent fevers, No recurrent infections. ?  ?????Musculoskeletal: ?Joint pain, No back pain, No muscle pain, No decreased range of motion. ?  ?????Integumentary: ?No rash, No pruritus, No petechiae, No skin lesion. ?  ?????Neurologic: ?Alert and oriented X4, No abnormal balance, No confusion, No tingling.  ?  ?????Psychiatric: ?No anxiety, No depression. ?  Physical Exam  Vitals & Measurements  T:?36.6? ?C (Oral)? TMIN:?36.1? ?C (Temporal Artery)? TMAX:?36.6? ?C (Oral)? HR:?93(Monitored)? RR:?20? BP:?154/77? SpO2:?94%? WT:?65?kg?  General: ?Alert and oriented, No acute distress. ?  ??????? ? Appearance: Well nourished. ?  ??????? ? Behavior: Appropriate. ?  ??????? ? Skin: Normal for ethnicity. ?  ?????Eye: ?Pupils are equal, round and reactive to light, Extraocular movements are intact. ?  ?????HENT: ?Normocephalic, Normal hearing, Oral mucosa is moist, No pharyngeal erythema. ?  ?????Neck: ?Supple, Non-tender, No carotid bruit, No jugular venous distention, No lymphadenopathy, No thyromegaly. ?  ?????Respiratory:??few wheezes, Breath sounds are equal, No chest wall tenderness. ?  ?????Cardiovascular: ?Normal rate, Regular rhythm, No murmur, No gallop, Good pulses equal in all extremities, Normal peripheral perfusion, No edema. ?  ?????Gastrointestinal: ?Soft, Non-tender, Non-distended, Normal bowel sounds, No organomegaly. ?  ?????Genitourinary: ?No costovertebral angle tenderness, No urethral discharge. ?  ?????Lymphatics: ?No lymphadenopathy neck, axilla, groin. ?  ?????Musculoskeletal: ?Normal range of motion, Normal strength, No tenderness, No swelling, Normal gait. ?  ?????Integumentary: ?Warm, Dry, Pink, Intact, No rash. ?  ?????Neurologic: ?Alert, Oriented, Normal sensory, Normal motor function, No focal deficits, Cranial Nerves II-XII are grossly intact. ?  ?????Psychiatric: ?Cooperative, Appropriate mood & affect, Normal judgment. ?  Assessment/Plan  Chest pain?[id number]  COPD exacerbation?J44.1  Tobacco user?Z72.0  IV steroids  DVT prophylaxis  IV antibiotics  Nebs  follow labs  cardiac enzymes  advised to stop smoking(cessation=4mins)  review home meds.   Problem List/Past Medical History  Ongoing  Tobacco user  Historical  COPD (chronic obstructive pulmonary  disease)  Procedure/Surgical History  Biopsy Gastrointestinal (03/18/2019)  Dilation of Esophagus, Via Natural or Artificial Opening Endoscopic (03/18/2019)  EGD & Esophegeal Dilation (None) (03/18/2019)  Esophagogastroduodenoscopy, flexible, transoral; with biopsy, single or multiple (03/18/2019)  Esophagogastroduodenoscopy, flexible, transoral; with insertion of guide wire followed by passage of dilator(s) through esophagus over guide wire (03/18/2019)  Excision of Esophagus, Via Natural or Artificial Opening Endoscopic, Diagnostic (03/18/2019)  Biopsy Gastrointestinal (02/11/2019)  Esophagogastroduodenoscopy (02/11/2019)  Esophagogastroduodenoscopy, flexible, transoral; with biopsy, single or multiple (02/11/2019)  Excision of Stomach, Pylorus, Via Natural or Artificial Opening Endoscopic, Diagnostic (02/11/2019)  Bilat Ears  Right hand  Tonsils and adenoids   Medications  Inpatient  acetaminophen, 1000 mg= 2 tab(s), Oral, q6hr, PRN  acetaminophen, 650 mg= 2 tab(s), Oral, q6hr, PRN  DuoNeb 0.5 mg-2.5 mg/3 mL inhalation solution, 3 mL, NEB, q6hr Resp  famotidine 20 mg oral tablet, 20 mg= 1 tab(s), Oral, BID  hydrochlorothiazide 25 mg oral tablet, 25 mg= 1 tab(s), Oral, Daily  levofloxacin IVPB ( Levaquin ), 500 mg= 100 mL, IV, q24hr  ProAir HFA 90 mcg/inh inhalation aerosol with adapter, 90 mcg= 1 puff(s), INH, q6hr, PRN  SOLU-Medrol, 60 mg= 1.5 mL, IV Push, q6ll-Ivgee  Vasotec 10 mg oral tablet, 10 mg= 1 tab(s), Oral, Daily  Zofran, 4 mg= 2 mL, IV Push, q4hr, PRN  Home  ADVAIR DISKU /50, 1 puff(s), INH,? ?Investigating  famotidine 20 mg oral tablet,? ?Investigating  hydrochlorothiazide-lisinopril 12.5 mg-10 mg oral tablet, 1 tab(s), Oral, Daily,? ?Investigating  omeprazole 40 mg oral DR capsule, 40 mg= 1 cap(s), Oral, Daily,? ?Investigating  ProAir HFA 90 mcg/inh inhalation aerosol with adapter, 1 puff(s), INH, q6hr, PRN,? ?Investigating  sucralfate 1 g oral tablet, 1 gm= 1 tab(s), Oral, TID,?  ?Investigating  Allergies  No Known Allergies  Social History  Alcohol  Current, Beer, 1-2 times per week, 02/06/2019  Employment/School  Work/School description: Disabled., 02/06/2019  Home/Environment  Lives with Alone., 02/06/2019  Sexual  Sexual orientation: Straight or heterosexual., 02/06/2019  Substance Abuse  Never, 03/10/2017  Tobacco  10 or more cigarettes (1/2 pack or more)/day in last 30 days, No, 05/07/2019  10 or more cigarettes (1/2 pack or more)/day in last 30 days, Cigarettes, No, 20 per day., 05/06/2019  Family History  Acute myocardial infarction.: Mother and Father.  Diabetes mellitus type 2: Mother.  Heart disease: Father.

## 2022-05-03 NOTE — HISTORICAL [facility]
This is a historical note converted from Maykel. Formatting and pictures may have been removed.  Please reference Maykel for original formatting and attached multimedia. Admit and Discharge Dates  Admit Date: 05/06/2019  Discharge Date: 05/07/2019  ?  Physicians  Attending Physician - Ana SAVAGE, Tray  Admitting Physician - Ana SAVAGE, Tray  Primary Care Physician - Tommy SAVAGE, Willem HESS  ?  Discharge Diagnosis  Chest pain?[id number]  COPD exacerbation?J44.1  Tobacco user?Z72.0  Surgical Procedures  No procedures recorded for this visit.  Immunizations  No immunizations recorded for this visit.  ?  Hospital Course  58yo male admitted for SOB and cough.? He has h/o COPD and continues to smoke.? He was placed on IV steroids, IV antibiotics and nebs.? Cardiac enzymes were negative.? Today he is breathing better an dis stable for discharge home.  Time Spent on discharge  D/C=33mins  Objective  Vitals & Measurements  T:?36.6? ?C (Oral)? TMIN:?36.1? ?C (Temporal Artery)? TMAX:?36.6? ?C (Oral)? HR:?88(Peripheral)? HR:?93(Monitored)? RR:?20? BP:?154/77? SpO2:?94%? WT:?65?kg?  Physical Exam  General: ?Alert and oriented, No acute distress. ?  ??????? ? Appearance: Well nourished. ?  ??????? ? Behavior: Appropriate. ?  ??????? ? Skin: Normal for ethnicity. ?  ?????Eye: ?Pupils are equal, round and reactive to light, Extraocular movements are intact. ?  ?????HENT: ?Normocephalic, Normal hearing, Oral mucosa is moist, No pharyngeal erythema. ?  ?????Neck: ?Supple, Non-tender, No carotid bruit, No jugular venous distention, No lymphadenopathy, No thyromegaly. ?  ?????Respiratory: ?Lungs are clear to auscultation, Breath sounds are equal, No chest wall tenderness. ?  ?????Cardiovascular: ?Normal rate, Regular rhythm, No murmur, No gallop, Good pulses equal in all extremities, Normal peripheral perfusion, No edema. ?  ?????Gastrointestinal: ?Soft, Non-tender, Non-distended, Normal bowel sounds, No  organomegaly. ?  ?????Genitourinary: ?No costovertebral angle tenderness, No urethral discharge. ?  ?????Lymphatics: ?No lymphadenopathy neck, axilla, groin. ?  ?????Musculoskeletal: ?Normal range of motion, Normal strength, No tenderness, No swelling, Normal gait. ?  ?????Integumentary: ?Warm, Dry, Pink, Intact, No rash. ?  ?????Neurologic: ?Alert, Oriented, Normal sensory, Normal motor function, No focal deficits, Cranial Nerves II-XII are grossly intact. ?  ?????Psychiatric: ?Cooperative, Appropriate mood & affect, Normal judgment. ?  Patient Discharge Condition  stable  Discharge Disposition  home   Discharge Medication Reconciliation  Prescribed  albuterol (ProAir HFA 90 mcg/inh inhalation aerosol with adapter)?90 mcg, INH, q6hr, PRN wheezing  famotidine (famotidine 20 mg oral tablet)?20 mg, Oral, BID  levoFLOXacin (Levaquin 500 mg oral tablet)?500 mg, Oral, q24hr  predniSONE (prednisONE 20 mg oral tablet)?20 mg, Oral, Daily  Continue  albuterol (ProAir HFA 90 mcg/inh inhalation aerosol with adapter)?1 puff(s), INH, q6hr, PRN for wheezing  hydrochlorothiazide-lisinopril (hydrochlorothiazide-lisinopril 12.5 mg-10 mg oral tablet)?1 tab(s), Oral, Daily  Discontinue  famotidine (famotidine 20 mg oral tablet)  fluticasone-salmeterol (ADVAIR DISKU /50)?1 puff(s), INH  omeprazole (omeprazole 40 mg oral DR capsule)?40 mg, Oral, Daily  sucralfate (sucralfate 1 g oral tablet)?1 gm, Oral, TID  ?  Education and Orders Provided  Chronic Obstructive Pulmonary Disease Exacerbation, Easy-to-Read  Chronic Obstructive Pulmonary Disease  Discharge Activity - Activity as Tolerated, stop smoking?  Discharge - 05/07/19 10:31:00 CDT, Home?  Discharge Diet - Cardiac?  ?  Follow up  pcp in 1-2 weeks  ?

## 2023-10-31 ENCOUNTER — PROCEDURE VISIT (OUTPATIENT)
Dept: RESPIRATORY THERAPY | Facility: HOSPITAL | Age: 63
End: 2023-10-31
Attending: NURSE PRACTITIONER
Payer: COMMERCIAL

## 2023-10-31 VITALS — HEART RATE: 100 BPM | OXYGEN SATURATION: 93 % | RESPIRATION RATE: 22 BRPM

## 2023-10-31 DIAGNOSIS — J44.9 CHRONIC OBSTRUCTIVE PULMONARY DISEASE, UNSPECIFIED COPD TYPE: ICD-10-CM

## 2023-10-31 LAB
DLCO ADJ PRE: 16.85 ML/(MIN*MMHG) (ref 16.12–29.98)
DLCO SINGLE BREATH LLN: 16.12
DLCO SINGLE BREATH PRE REF: 73.1 %
DLCO SINGLE BREATH REF: 23.05
DLCOC SBVA LLN: 2.57
DLCOC SBVA PRE REF: 93.8 %
DLCOC SBVA REF: 4.04
DLCOC SINGLE BREATH LLN: 16.12
DLCOC SINGLE BREATH PRE REF: 73.1 %
DLCOC SINGLE BREATH REF: 23.05
DLCOVA LLN: 2.57
DLCOVA PRE REF: 93.8 %
DLCOVA PRE: 3.79 ML/(MIN*MMHG*L) (ref 2.57–5.51)
DLCOVA REF: 4.04
DLVAADJ PRE: 3.79 ML/(MIN*MMHG*L) (ref 2.57–5.51)
ERV LLN: -16449.01
ERV PRE REF: 68.8 %
ERV REF: 0.99
FEF 25 75 CHG: 6.3 %
FEF 25 75 LLN: 1.16
FEF 25 75 POST REF: 11.2 %
FEF 25 75 PRE REF: 10.5 %
FEF 25 75 REF: 2.41
FET100 CHG: 9.4 %
FEV1 CHG: 8 %
FEV1 FVC CHG: -4.9 %
FEV1 FVC LLN: 66
FEV1 FVC POST REF: 46 %
FEV1 FVC PRE REF: 48.4 %
FEV1 FVC REF: 79
FEV1 LLN: 2.09
FEV1 POST REF: 39.1 %
FEV1 PRE REF: 36.2 %
FEV1 REF: 2.79
FRCPLETH LLN: 2.22
FRCPLETH PREREF: 131.4 %
FRCPLETH REF: 3.2
FVC CHG: 13.7 %
FVC LLN: 2.69
FVC POST REF: 85 %
FVC PRE REF: 74.7 %
FVC REF: 3.54
IVC PRE: 2.83 L (ref 2.69–4.4)
IVC SINGLE BREATH LLN: 2.69
IVC SINGLE BREATH PRE REF: 80 %
IVC SINGLE BREATH REF: 3.54
MVV LLN: 89
MVV PRE REF: 28.6 %
MVV REF: 105
PEF CHG: 10.4 %
PEF LLN: 5.77
PEF POST REF: 42.9 %
PEF PRE REF: 38.8 %
PEF REF: 7.65
POST FEF 25 75: 0.27 L/S (ref 1.16–3.66)
POST FET 100: 18.8 SEC
POST FEV1 FVC: 36.13 % (ref 66.07–90.96)
POST FEV1: 1.09 L (ref 2.09–3.49)
POST FVC: 3.01 L (ref 2.69–4.4)
POST PEF: 3.28 L/S (ref 5.77–9.52)
PRE DLCO: 16.85 ML/(MIN*MMHG) (ref 16.12–29.98)
PRE ERV: 0.69 L (ref -16449.01–16450.99)
PRE FEF 25 75: 0.25 L/S (ref 1.16–3.66)
PRE FET 100: 17.18 SEC
PRE FEV1 FVC: 38.01 % (ref 66.07–90.96)
PRE FEV1: 1.01 L (ref 2.09–3.49)
PRE FRC PL: 4.21 L
PRE FVC: 2.65 L (ref 2.69–4.4)
PRE MVV: 30 L/MIN (ref 89.11–120.57)
PRE PEF: 2.97 L/S (ref 5.77–9.52)
PRE RV: 3.5 L (ref 1.53–2.88)
PRE TLC: 6.15 L (ref 4.55–6.86)
RAW LLN: 3.06
RAW PRE REF: 163.9 %
RAW PRE: 5.01 CMH2O*S/L (ref 3.06–3.06)
RAW REF: 3.06
RV LLN: 1.53
RV PRE REF: 158.6 %
RV REF: 2.21
RVTLC LLN: 29
RVTLC PRE REF: 150.8 %
RVTLC PRE: 56.93 % (ref 28.77–46.73)
RVTLC REF: 38
TLC LLN: 4.55
TLC PRE REF: 107.8 %
TLC REF: 5.7
VA PRE: 4.45 L (ref 5.55–5.55)
VA SINGLE BREATH LLN: 5.55
VA SINGLE BREATH PRE REF: 80.1 %
VA SINGLE BREATH REF: 5.55
VC LLN: 2.69
VC PRE REF: 74.7 %
VC PRE: 2.65 L (ref 2.69–4.4)
VC REF: 3.54
VTGRAWPRE: 4.68 L

## 2023-10-31 PROCEDURE — 94727 GAS DIL/WSHOT DETER LNG VOL: CPT

## 2023-10-31 PROCEDURE — 94761 N-INVAS EAR/PLS OXIMETRY MLT: CPT

## 2023-10-31 PROCEDURE — 99900035 HC TECH TIME PER 15 MIN (STAT)

## 2023-10-31 PROCEDURE — 94060 EVALUATION OF WHEEZING: CPT

## 2023-10-31 PROCEDURE — 94729 DIFFUSING CAPACITY: CPT

## 2023-10-31 PROCEDURE — 99900031 HC PATIENT EDUCATION (STAT)

## 2023-10-31 PROCEDURE — 25000242 PHARM REV CODE 250 ALT 637 W/ HCPCS: Performed by: NURSE PRACTITIONER

## 2023-10-31 RX ORDER — ALBUTEROL SULFATE 0.83 MG/ML
2.5 SOLUTION RESPIRATORY (INHALATION)
Status: COMPLETED | OUTPATIENT
Start: 2023-10-31 | End: 2023-10-31

## 2023-10-31 RX ADMIN — ALBUTEROL SULFATE 2.5 MG: 2.5 SOLUTION RESPIRATORY (INHALATION) at 11:10

## 2024-03-27 ENCOUNTER — HOSPITAL ENCOUNTER (OUTPATIENT)
Dept: RADIOLOGY | Facility: HOSPITAL | Age: 64
Discharge: HOME | End: 2024-03-27
Attending: FAMILY MEDICINE
Payer: COMMERCIAL

## 2024-03-27 DIAGNOSIS — M25.551 RIGHT HIP PAIN: ICD-10-CM

## 2024-03-27 PROCEDURE — 73502 X-RAY EXAM HIP UNI 2-3 VIEWS: CPT | Mod: TC,RT

## 2024-03-27 PROCEDURE — 72100 X-RAY EXAM L-S SPINE 2/3 VWS: CPT | Mod: TC

## 2024-03-28 ENCOUNTER — LAB VISIT (OUTPATIENT)
Dept: LAB | Facility: HOSPITAL | Age: 64
End: 2024-03-28
Attending: FAMILY MEDICINE
Payer: COMMERCIAL

## 2024-03-28 DIAGNOSIS — Z12.11 SCREENING FOR COLON CANCER: Primary | ICD-10-CM

## 2024-03-28 LAB
HEMOCCULT SP1 STL QL: POSITIVE
HEMOCCULT SP2 STL QL: POSITIVE
HEMOCCULT SP3 STL QL: POSITIVE

## 2024-03-28 PROCEDURE — 82270 OCCULT BLOOD FECES: CPT

## 2024-04-08 DIAGNOSIS — R97.20 ELEVATED PSA: Primary | ICD-10-CM

## 2024-04-19 ENCOUNTER — OFFICE VISIT (OUTPATIENT)
Dept: UROLOGY | Facility: CLINIC | Age: 64
End: 2024-04-19
Payer: COMMERCIAL

## 2024-04-19 VITALS
HEART RATE: 98 BPM | RESPIRATION RATE: 18 BRPM | WEIGHT: 120 LBS | DIASTOLIC BLOOD PRESSURE: 80 MMHG | OXYGEN SATURATION: 98 % | BODY MASS INDEX: 21.26 KG/M2 | SYSTOLIC BLOOD PRESSURE: 132 MMHG | HEIGHT: 63 IN

## 2024-04-19 DIAGNOSIS — R97.20 ELEVATED PSA: ICD-10-CM

## 2024-04-19 LAB
BILIRUB SERPL-MCNC: NORMAL MG/DL
BLOOD URINE, POC: NORMAL
COLOR, POC UA: YELLOW
GLUCOSE UR QL STRIP: NORMAL
KETONES UR QL STRIP: NORMAL
LEUKOCYTE ESTERASE URINE, POC: NORMAL
NITRITE, POC UA: NORMAL
PH, POC UA: 7.5
PROTEIN, POC: NORMAL
SPECIFIC GRAVITY, POC UA: 1.01
UROBILINOGEN, POC UA: 0.2

## 2024-04-19 PROCEDURE — 81001 URINALYSIS AUTO W/SCOPE: CPT | Mod: PBBFAC | Performed by: UROLOGY

## 2024-04-19 PROCEDURE — 1159F MED LIST DOCD IN RCRD: CPT | Mod: CPTII,,, | Performed by: UROLOGY

## 2024-04-19 PROCEDURE — 1160F RVW MEDS BY RX/DR IN RCRD: CPT | Mod: CPTII,,, | Performed by: UROLOGY

## 2024-04-19 PROCEDURE — 3079F DIAST BP 80-89 MM HG: CPT | Mod: CPTII,,, | Performed by: UROLOGY

## 2024-04-19 PROCEDURE — 3044F HG A1C LEVEL LT 7.0%: CPT | Mod: CPTII,,, | Performed by: UROLOGY

## 2024-04-19 PROCEDURE — 3075F SYST BP GE 130 - 139MM HG: CPT | Mod: CPTII,,, | Performed by: UROLOGY

## 2024-04-19 PROCEDURE — 99213 OFFICE O/P EST LOW 20 MIN: CPT | Mod: PBBFAC | Performed by: UROLOGY

## 2024-04-19 PROCEDURE — 3008F BODY MASS INDEX DOCD: CPT | Mod: CPTII,,, | Performed by: UROLOGY

## 2024-04-19 PROCEDURE — 99204 OFFICE O/P NEW MOD 45 MIN: CPT | Mod: S$PBB,,, | Performed by: UROLOGY

## 2024-04-19 RX ORDER — SULFAMETHOXAZOLE AND TRIMETHOPRIM 800; 160 MG/1; MG/1
1 TABLET ORAL 2 TIMES DAILY
Qty: 56 TABLET | Refills: 0 | Status: SHIPPED | OUTPATIENT
Start: 2024-04-19 | End: 2024-05-17

## 2024-04-19 NOTE — PROGRESS NOTES
I saw and evaluated the patient with the resident.  I discussed with the resident and agree with the resident's history, physical, assessment, findings and care plan as documented in the resident's note.        Juliette Quintanilla DO  Urology  [facility]

## 2024-04-19 NOTE — PROGRESS NOTES
CC: elevated PSA    HPI:  Andrea Vanessa is a 64 y.o. male here as a consultation for elevated PSA.     He had a PSA 3/27/2024 which was 5.09.  The previous value two years ago was 2.62.  He has no family history of prostate cancer.  He is unable to recall his last prostate exam.  He reports no urinary symptoms, has a good stream, no hematuria, no dysuria, no nocturia.    Urinalysis:  Color, UA Yellow    Spec Grav UA 1.015   pH, UA 7.5    WBC, UA trace   Nitrite, UA Negative    Protein, POC Negative    Glucose, UA Negative    Ketones, UA Negative    Urobilinogen, UA 0.2    Bilirubin, POC Negative    Blood, UA Negative          ROS:  All systems reviewed and are negative except as documented in HPI and/or Assessment and Plan.     Patient Active Problem List:     There is no problem list on file for this patient.       Past Medical History:  No past medical history on file.     Past Surgical History:  No past surgical history on file.     Family History  No family history on file.     Social History:  Social History     Socioeconomic History    Marital status: Single   Tobacco Use    Smoking status: Every Day     Types: Cigarettes     Start date: 9/11/1974    Smokeless tobacco: Never        Medications:  Current Outpatient Medications   Medication Instructions    albuterol (PROVENTIL/VENTOLIN HFA) 90 mcg/actuation inhaler 2 puffs, Inhalation, Every 6 hours PRN, Rescue    sulfamethoxazole-trimethoprim 800-160mg (BACTRIM DS) 800-160 mg Tab 1 tablet, Oral, 2 times daily    umeclidinium-vilanteroL (ANORO ELLIPTA) 62.5-25 mcg/actuation DsDv 1 puff, Inhalation, Daily, Controller        Allergies:  Review of patient's allergies indicates:  No Known Allergies     Objective:  Vitals:    04/19/24 1046   BP: 132/80   Pulse: 98   Resp: 18     General:  Well developed, well nourished adult male in no acute distress  Extremities:  No clubbing, cyanosis, or edema  Neurologic:  Grossly intact  Musculoskeletal:  Normal tone  :   "Perineum without erythema/lesions.  Anus patent, rectal tone intact, external hemorrhoids present  Prostate:  2 finger breath wide, smooth, soft, nontender, no nodules or masses appreciated  Penis:  Circumcised, no lesions and scrotal skin intact without erythema or lesion.  Meatus patent.  Testicles:  Nontender, no masses  Epididymis:  Normal without masses    Chaperone present for  exam: Vancara St Cedric, LPN      Lab Results:    No results for input(s): "CBC" in the last 760 hours.   Recent Labs     03/27/24  0950   CREATININE 0.84       Latest Reference Range & Units 01/09/20 09:11 08/04/20 08:14 09/22/21 09:42 03/27/24 09:50   Prostate Specific Antigen <=4.00 ng/mL 4.70 (H) 3.11 2.62 5.09 (H)       Imaging: N/A      Assessment:  Elevated PSA  -     Ambulatory referral/consult to Urology  -     POCT URINE DIPSTICK WITH MICROSCOPE, AUTOMATED  -     sulfamethoxazole-trimethoprim 800-160mg (BACTRIM DS) 800-160 mg Tab; Take 1 tablet by mouth 2 (two) times daily.  Dispense: 56 tablet; Refill: 0  -     PSA, Total (Diagnostic); Future; Expected date: 05/27/2024        Plan:    Trial of Bactrim for a month.    Repeat PSA in 6 weeks    Follow up:    RTC in 6 weeks    Tri Solis MD  [employer]  PGY-3              "

## 2024-05-30 ENCOUNTER — HOSPITAL ENCOUNTER (OUTPATIENT)
Dept: RADIOLOGY | Facility: HOSPITAL | Age: 64
Discharge: HOME | End: 2024-05-30
Attending: HOSPITALIST
Payer: COMMERCIAL

## 2024-05-30 DIAGNOSIS — Z87.891 PERSONAL HISTORY OF TOBACCO USE, PRESENTING HAZARDS TO HEALTH: ICD-10-CM

## 2024-05-30 PROCEDURE — 71271 CT THORAX LUNG CANCER SCR C-: CPT | Mod: TC

## 2024-06-06 ENCOUNTER — OFFICE VISIT (OUTPATIENT)
Dept: UROLOGY | Facility: CLINIC | Age: 64
End: 2024-06-06
Payer: COMMERCIAL

## 2024-06-06 VITALS
OXYGEN SATURATION: 83 % | HEART RATE: 109 BPM | TEMPERATURE: 98 F | DIASTOLIC BLOOD PRESSURE: 87 MMHG | BODY MASS INDEX: 23.39 KG/M2 | RESPIRATION RATE: 24 BRPM | HEIGHT: 63 IN | SYSTOLIC BLOOD PRESSURE: 141 MMHG | WEIGHT: 132 LBS

## 2024-06-06 DIAGNOSIS — R97.20 ELEVATED PSA: Primary | ICD-10-CM

## 2024-06-06 LAB
BILIRUB SERPL-MCNC: NORMAL MG/DL
BLOOD URINE, POC: NORMAL
COLOR, POC UA: NORMAL
GLUCOSE UR QL STRIP: NEGATIVE
KETONES UR QL STRIP: NEGATIVE
LEUKOCYTE ESTERASE URINE, POC: NEGATIVE
NITRITE, POC UA: NEGATIVE
PH, POC UA: 6.5
PROTEIN, POC: 100
SPECIFIC GRAVITY, POC UA: 1.02
UROBILINOGEN, POC UA: 1

## 2024-06-06 PROCEDURE — 99213 OFFICE O/P EST LOW 20 MIN: CPT | Mod: PBBFAC | Performed by: UROLOGY

## 2024-06-06 PROCEDURE — 3008F BODY MASS INDEX DOCD: CPT | Mod: CPTII,,, | Performed by: UROLOGY

## 2024-06-06 PROCEDURE — 3077F SYST BP >= 140 MM HG: CPT | Mod: CPTII,,, | Performed by: UROLOGY

## 2024-06-06 PROCEDURE — 3079F DIAST BP 80-89 MM HG: CPT | Mod: CPTII,,, | Performed by: UROLOGY

## 2024-06-06 PROCEDURE — 99213 OFFICE O/P EST LOW 20 MIN: CPT | Mod: S$PBB,,, | Performed by: UROLOGY

## 2024-06-06 PROCEDURE — 81001 URINALYSIS AUTO W/SCOPE: CPT | Mod: PBBFAC | Performed by: UROLOGY

## 2024-06-06 PROCEDURE — 1160F RVW MEDS BY RX/DR IN RCRD: CPT | Mod: CPTII,,, | Performed by: UROLOGY

## 2024-06-06 PROCEDURE — 1159F MED LIST DOCD IN RCRD: CPT | Mod: CPTII,,, | Performed by: UROLOGY

## 2024-06-06 PROCEDURE — 3044F HG A1C LEVEL LT 7.0%: CPT | Mod: CPTII,,, | Performed by: UROLOGY

## 2024-06-06 NOTE — PROGRESS NOTES
CC:  Elevated PSA    HPI:  Andrea Vanessa is a 64 y.o. male seen for follow-up of elevated PSA.  He was found to have a PSA of 5.09 in March 2024.  He does have a history of an elevated PSA in 2020 up to 4.70 which then came down to normal.  He had a repeat PSA today to decide on the need for a biopsy.    He denies urinary problems.     Urinalysis:  Results for orders placed or performed in visit on 06/06/24   POCT URINE DIPSTICK WITH MICROSCOPE, AUTOMATED   Result Value Ref Range    Color, UA Orange     Spec Grav UA 1.020     pH, UA 6.5     WBC, UA Negative     Nitrite, UA Negative     Protein,      Glucose, UA Negative     Ketones, UA Negative     Urobilinogen, UA 1.0     Bilirubin, POC Small     Blood, UA Trace-intact      Microscopic Urinalysis:  WBC:   None per HPF     RBC:    None per HPF     Bacteria:    None per HPF     Squamous epithelial cells:    None per HPF      Crystals:   None    Lab Results:  PSA History:    01/09/20 09:11 08/04/20 08:14 09/22/21 09:42 03/27/24 09:50 05/30/24 09:32   4.70 (H) 3.11 2.62 5.09 (H) 4.58 (H)     ROS:  All systems reviewed and are negative except as documented in HPI and/or Assessment and Plan.     Patient Active Problem List:     There is no problem list on file for this patient.       Past Medical History:  Past Medical History:   Diagnosis Date    COPD (chronic obstructive pulmonary disease)         Past Surgical History:  Past Surgical History:   Procedure Laterality Date    Bilateral ear surgery Bilateral     ESOPHAGUS SURGERY          Family History:  History reviewed. No pertinent family history.     Social History:  Social History     Socioeconomic History    Marital status: Single   Tobacco Use    Smoking status: Every Day     Types: Cigarettes     Start date: 9/11/1974    Smokeless tobacco: Never        Allergies:  Review of patient's allergies indicates:  No Known Allergies     Objective:  Vitals:    06/06/24 1035   BP: (!) 141/87   Pulse: 109   Resp:  (!) 24   Temp: 97.7 °F (36.5 °C)     General:  Well developed, well nourished adult male in no acute distress  Abdomen: Soft, nontender, no masses  Extremities:  No clubbing, cyanosis, or edema  Neurologic:  Grossly intact  Musculoskeletal:  Normal tone    Last LEANDRO:  April 2024    Assessment:  1. Elevated PSA  - POCT URINE DIPSTICK WITH MICROSCOPE, AUTOMATED  - PSA, Total (Diagnostic); Future     Plan:  The PSA came down so will continue to watch this with a  repeat in three months.      Follow-up:  PSA in three months and follow-up after.

## 2024-06-13 ENCOUNTER — HOSPITAL ENCOUNTER (INPATIENT)
Facility: HOSPITAL | Age: 64
LOS: 2 days | Discharge: HOME | DRG: 193 | End: 2024-06-15
Attending: INTERNAL MEDICINE | Admitting: INTERNAL MEDICINE
Payer: COMMERCIAL

## 2024-06-13 DIAGNOSIS — J18.9 PNEUMONIA OF RIGHT LOWER LOBE DUE TO INFECTIOUS ORGANISM: Primary | ICD-10-CM

## 2024-06-13 DIAGNOSIS — R06.02 SHORTNESS OF BREATH: ICD-10-CM

## 2024-06-13 DIAGNOSIS — J44.1 COPD EXACERBATION: ICD-10-CM

## 2024-06-13 PROBLEM — N13.8 BENIGN PROSTATIC HYPERPLASIA WITH URINARY OBSTRUCTION: Status: ACTIVE | Noted: 2020-02-17

## 2024-06-13 PROBLEM — J43.9 PULMONARY EMPHYSEMA: Status: ACTIVE | Noted: 2024-06-13

## 2024-06-13 PROBLEM — N40.1 BENIGN PROSTATIC HYPERPLASIA WITH URINARY OBSTRUCTION: Status: ACTIVE | Noted: 2020-02-17

## 2024-06-13 PROBLEM — J96.11 CHRONIC RESPIRATORY FAILURE WITH HYPOXIA: Status: ACTIVE | Noted: 2024-06-13

## 2024-06-13 LAB
ALBUMIN SERPL-MCNC: 3.4 G/DL (ref 3.4–4.8)
ALBUMIN/GLOB SERPL: 1 RATIO (ref 1.1–2)
ALP SERPL-CCNC: 70 UNIT/L (ref 40–150)
ALT SERPL-CCNC: 15 UNIT/L (ref 0–55)
ANION GAP SERPL CALC-SCNC: 9 MEQ/L
AST SERPL-CCNC: 19 UNIT/L (ref 5–34)
BACTERIA #/AREA URNS AUTO: ABNORMAL /HPF
BASOPHILS # BLD AUTO: 0.01 X10(3)/MCL
BASOPHILS NFR BLD AUTO: 0.2 %
BILIRUB SERPL-MCNC: 1 MG/DL
BILIRUB UR QL STRIP.AUTO: ABNORMAL
BNP BLD-MCNC: 887 PG/ML
BUN SERPL-MCNC: 7 MG/DL (ref 8.4–25.7)
CALCIUM SERPL-MCNC: 9 MG/DL (ref 8.8–10)
CHLORIDE SERPL-SCNC: 93 MMOL/L (ref 98–107)
CLARITY UR: ABNORMAL
CO2 SERPL-SCNC: 36 MMOL/L (ref 23–31)
COLOR UR AUTO: ABNORMAL
CREAT SERPL-MCNC: 0.69 MG/DL (ref 0.73–1.18)
CREAT/UREA NIT SERPL: 10
D DIMER PPP IA.FEU-MCNC: 0.48 UG/ML FEU (ref 0–0.5)
EOSINOPHIL # BLD AUTO: 0.13 X10(3)/MCL (ref 0–0.9)
EOSINOPHIL NFR BLD AUTO: 2 %
ERYTHROCYTE [DISTWIDTH] IN BLOOD BY AUTOMATED COUNT: 16.8 % (ref 11.5–17)
GFR SERPLBLD CREATININE-BSD FMLA CKD-EPI: >60 ML/MIN/1.73/M2
GLOBULIN SER-MCNC: 3.5 GM/DL (ref 2.4–3.5)
GLUCOSE SERPL-MCNC: 89 MG/DL (ref 82–115)
GLUCOSE UR QL STRIP: NEGATIVE
HCT VFR BLD AUTO: 57 % (ref 42–52)
HGB BLD-MCNC: 18.2 G/DL (ref 14–18)
HGB UR QL STRIP: ABNORMAL
IMM GRANULOCYTES # BLD AUTO: 0.13 X10(3)/MCL (ref 0–0.04)
IMM GRANULOCYTES NFR BLD AUTO: 2 %
KETONES UR QL STRIP: NEGATIVE
LACTATE SERPL-SCNC: 1.3 MMOL/L (ref 0.5–2.2)
LEUKOCYTE ESTERASE UR QL STRIP: NEGATIVE
LYMPHOCYTES # BLD AUTO: 0.95 X10(3)/MCL (ref 0.6–4.6)
LYMPHOCYTES NFR BLD AUTO: 14.5 %
MCH RBC QN AUTO: 32.3 PG (ref 27–31)
MCHC RBC AUTO-ENTMCNC: 31.9 G/DL (ref 33–36)
MCV RBC AUTO: 101.2 FL (ref 80–94)
MONOCYTES # BLD AUTO: 0.68 X10(3)/MCL (ref 0.1–1.3)
MONOCYTES NFR BLD AUTO: 10.4 %
NEUTROPHILS # BLD AUTO: 4.63 X10(3)/MCL (ref 2.1–9.2)
NEUTROPHILS NFR BLD AUTO: 70.9 %
NITRITE UR QL STRIP: NEGATIVE
OHS QRS DURATION: 86 MS
OHS QTC CALCULATION: 448 MS
PH UR STRIP: 6.5 [PH]
PLATELET # BLD AUTO: 173 X10(3)/MCL (ref 130–400)
PMV BLD AUTO: 9.9 FL (ref 7.4–10.4)
POTASSIUM SERPL-SCNC: 4.5 MMOL/L (ref 3.5–5.1)
PROT SERPL-MCNC: 6.9 GM/DL (ref 5.8–7.6)
PROT UR QL STRIP: ABNORMAL
RBC # BLD AUTO: 5.63 X10(6)/MCL (ref 4.7–6.1)
RBC #/AREA URNS AUTO: ABNORMAL /HPF
SODIUM SERPL-SCNC: 138 MMOL/L (ref 136–145)
SP GR UR STRIP.AUTO: 1.01 (ref 1–1.03)
SQUAMOUS #/AREA URNS AUTO: ABNORMAL /HPF
TROPONIN I SERPL-MCNC: 0.02 NG/ML (ref 0–0.04)
UROBILINOGEN UR STRIP-ACNC: 2
WBC # SPEC AUTO: 6.53 X10(3)/MCL (ref 4.5–11.5)
WBC #/AREA URNS AUTO: ABNORMAL /HPF

## 2024-06-13 PROCEDURE — 11000001 HC ACUTE MED/SURG PRIVATE ROOM

## 2024-06-13 PROCEDURE — 25000003 PHARM REV CODE 250: Performed by: INTERNAL MEDICINE

## 2024-06-13 PROCEDURE — 80053 COMPREHEN METABOLIC PANEL: CPT | Performed by: INTERNAL MEDICINE

## 2024-06-13 PROCEDURE — 94761 N-INVAS EAR/PLS OXIMETRY MLT: CPT

## 2024-06-13 PROCEDURE — 63600175 PHARM REV CODE 636 W HCPCS: Performed by: INTERNAL MEDICINE

## 2024-06-13 PROCEDURE — 94645 CONT INHLJ TX EACH ADDL HOUR: CPT

## 2024-06-13 PROCEDURE — A4216 STERILE WATER/SALINE, 10 ML: HCPCS | Performed by: INTERNAL MEDICINE

## 2024-06-13 PROCEDURE — 83880 ASSAY OF NATRIURETIC PEPTIDE: CPT | Performed by: INTERNAL MEDICINE

## 2024-06-13 PROCEDURE — 81001 URINALYSIS AUTO W/SCOPE: CPT | Performed by: INTERNAL MEDICINE

## 2024-06-13 PROCEDURE — 99285 EMERGENCY DEPT VISIT HI MDM: CPT | Mod: 25

## 2024-06-13 PROCEDURE — 84484 ASSAY OF TROPONIN QUANT: CPT | Performed by: INTERNAL MEDICINE

## 2024-06-13 PROCEDURE — 87040 BLOOD CULTURE FOR BACTERIA: CPT | Performed by: INTERNAL MEDICINE

## 2024-06-13 PROCEDURE — 93010 ELECTROCARDIOGRAM REPORT: CPT | Mod: ,,, | Performed by: INTERNAL MEDICINE

## 2024-06-13 PROCEDURE — 94640 AIRWAY INHALATION TREATMENT: CPT

## 2024-06-13 PROCEDURE — 85379 FIBRIN DEGRADATION QUANT: CPT | Performed by: INTERNAL MEDICINE

## 2024-06-13 PROCEDURE — 83605 ASSAY OF LACTIC ACID: CPT | Performed by: INTERNAL MEDICINE

## 2024-06-13 PROCEDURE — 25000242 PHARM REV CODE 250 ALT 637 W/ HCPCS: Performed by: INTERNAL MEDICINE

## 2024-06-13 PROCEDURE — 27000221 HC OXYGEN, UP TO 24 HOURS

## 2024-06-13 PROCEDURE — 85025 COMPLETE CBC W/AUTO DIFF WBC: CPT | Performed by: INTERNAL MEDICINE

## 2024-06-13 PROCEDURE — 93005 ELECTROCARDIOGRAM TRACING: CPT

## 2024-06-13 RX ORDER — BUDESONIDE 0.5 MG/2ML
0.5 INHALANT ORAL EVERY 12 HOURS
Status: DISCONTINUED | OUTPATIENT
Start: 2024-06-13 | End: 2024-06-15 | Stop reason: HOSPADM

## 2024-06-13 RX ORDER — ACETAMINOPHEN 325 MG/1
650 TABLET ORAL EVERY 8 HOURS PRN
Status: DISCONTINUED | OUTPATIENT
Start: 2024-06-13 | End: 2024-06-15 | Stop reason: HOSPADM

## 2024-06-13 RX ORDER — SODIUM CHLORIDE 0.9 % (FLUSH) 0.9 %
10 SYRINGE (ML) INJECTION EVERY 8 HOURS
Status: DISCONTINUED | OUTPATIENT
Start: 2024-06-13 | End: 2024-06-15 | Stop reason: HOSPADM

## 2024-06-13 RX ORDER — IPRATROPIUM BROMIDE AND ALBUTEROL SULFATE 2.5; .5 MG/3ML; MG/3ML
3 SOLUTION RESPIRATORY (INHALATION) EVERY 6 HOURS PRN
Status: DISCONTINUED | OUTPATIENT
Start: 2024-06-13 | End: 2024-06-15 | Stop reason: HOSPADM

## 2024-06-13 RX ORDER — ENOXAPARIN SODIUM 100 MG/ML
40 INJECTION SUBCUTANEOUS EVERY 24 HOURS
Status: DISCONTINUED | OUTPATIENT
Start: 2024-06-13 | End: 2024-06-15 | Stop reason: HOSPADM

## 2024-06-13 RX ORDER — CLONIDINE HYDROCHLORIDE 0.2 MG/1
0.2 TABLET ORAL 3 TIMES DAILY PRN
Status: DISCONTINUED | OUTPATIENT
Start: 2024-06-13 | End: 2024-06-15 | Stop reason: HOSPADM

## 2024-06-13 RX ADMIN — Medication 10 ML: at 05:06

## 2024-06-13 RX ADMIN — AZITHROMYCIN MONOHYDRATE 500 MG: 500 INJECTION, POWDER, LYOPHILIZED, FOR SOLUTION INTRAVENOUS at 01:06

## 2024-06-13 RX ADMIN — CEFTRIAXONE SODIUM 1 G: 1 INJECTION, POWDER, FOR SOLUTION INTRAMUSCULAR; INTRAVENOUS at 12:06

## 2024-06-13 RX ADMIN — METHYLPREDNISOLONE SODIUM SUCCINATE 60 MG: 40 INJECTION INTRAMUSCULAR; INTRAVENOUS at 05:06

## 2024-06-13 RX ADMIN — CLONIDINE HYDROCHLORIDE 0.2 MG: 0.2 TABLET ORAL at 01:06

## 2024-06-13 RX ADMIN — BUDESONIDE 0.5 MG: 0.5 INHALANT RESPIRATORY (INHALATION) at 07:06

## 2024-06-13 RX ADMIN — IPRATROPIUM BROMIDE AND ALBUTEROL SULFATE 3 ML: .5; 3 SOLUTION RESPIRATORY (INHALATION) at 07:06

## 2024-06-13 RX ADMIN — Medication 10 ML: at 09:06

## 2024-06-13 RX ADMIN — IPRATROPIUM BROMIDE AND ALBUTEROL SULFATE 3 ML: .5; 3 SOLUTION RESPIRATORY (INHALATION) at 04:06

## 2024-06-13 RX ADMIN — ENOXAPARIN SODIUM 40 MG: 40 INJECTION SUBCUTANEOUS at 05:06

## 2024-06-13 NOTE — H&P
[facility] Hospital Medicine History & Physical Examination       Patient Name: Andrea Vanessa  MRN: [id number]  Patient Class: IP- Inpatient   Admission Date: 6/13/2024   Admitting Physician: FORTINO Service   Length of Stay: 0  Attending Physician: Silvano Baker MD  Primary Care Provider: Willem Sanders MD  Face-to-Face encounter date: 06/13/2024  Code Status:  Chief Complaint: Shortness of Breath (C/o SOB for the past 4 days, hx of COPD)      Screening for Social Drivers for health:  Patient screened for food insecurity, housing instability, transportation needs, utility difficulties, and interpersonal safety (select all that apply as identified as concern)  []Housing or Food  []Transportation Needs  []Utility Difficulties  []Interpersonal safety  [x]None      Patient information was obtained from patient, patient's family, past medical records and ER records.  ED records were reviewed in detail and documented below    HISTORY OF PRESENT ILLNESS:   Andrea Vanessa is a 64 y.o. male who  has a past medical history of BPH (benign prostatic hyperplasia), COPD (chronic obstructive pulmonary disease), GERD (gastroesophageal reflux disease), and Hypertension..     A 64 years old male history of COPD on home oxygen presented to emergency room complain of shortness for breath and cough for 4 days     No fever, no chest pain, no nausea, no vomiting     Cough with the yellow and green sputum     Patient still smoke 2 packs a day every day     At emergency room, WBC 6, hemoglobin 18, BUN 7, creatinine 0.6,      Chest x-ray shows right lower lobe pneumonia     The patient was admitted to the hospital for acute on chronic COPD exacerbation secondary to right lower lobe pneumonia    PAST MEDICAL HISTORY:     Past Medical History:   Diagnosis Date    BPH (benign prostatic hyperplasia)     COPD (chronic obstructive pulmonary disease)     GERD (gastroesophageal reflux disease)      Hypertension        PAST SURGICAL HISTORY:     Past Surgical History:   Procedure Laterality Date    Bilateral ear surgery Bilateral     ESOPHAGEAL DILATION         ALLERGIES:   Patient has no known allergies.    FAMILY HISTORY:   Reviewed and negative    SOCIAL HISTORY:     Social History     Tobacco Use    Smoking status: Every Day     Types: Cigarettes     Start date: 9/11/1974    Smokeless tobacco: Never   Substance Use Topics    Alcohol use: Yes     Comment: On weekends        HOME MEDICATIONS:     Prior to Admission medications    Medication Sig Start Date End Date Taking? Authorizing Provider   albuterol (PROVENTIL/VENTOLIN HFA) 90 mcg/actuation inhaler Inhale 2 puffs into the lungs every 6 (six) hours as needed for Wheezing. Rescue   Yes Provider, Historical   umeclidinium-vilanteroL (ANORO ELLIPTA) 62.5-25 mcg/actuation DsDv Inhale 1 puff into the lungs once daily. Controller 5/8/24  Yes Vasiliy Vale MD       REVIEW OF SYSTEMS:   Except as documented, all other systems reviewed and negative     PHYSICAL EXAM:     VITAL SIGNS: 24 HRS MIN & MAX LAST   Temp  Min: 97.8 °F (36.6 °C)  Max: 98.2 °F (36.8 °C) 97.8 °F (36.6 °C)   BP  Min: 116/84  Max: 175/103 (!) 156/102   Pulse  Min: 89  Max: 112  98   Resp  Min: 17  Max: 34 (!) 21   SpO2  Min: 79 %  Max: 95 % 95 %     General appearance: Well-developed, well-nourished male in no apparent distress.  HENT: Atraumatic head. Moist mucous membranes of oral cavity.  Eyes: Normal extraocular movements.   Neck: Supple.   Lungs:  Bilateral wheezing  Heart: Regular rate and rhythm. S1 and S2 present with no murmurs/gallop/rub. No pedal edema. No JVD present.   Abdomen: Soft, non-distended, non-tender. No rebound tenderness/guarding. Bowel sounds are normal.   Extremities: No cyanosis, clubbing, or edema.  Skin: No Rash.   Neuro: Motor and sensory exams grossly intact. Good tone. Muscle strength 5/5 in all 4 extremities  Psych/mental status: Appropriate mood and affect.  Responds appropriately to questions.     LABS AND IMAGING:     Recent Labs   Lab 06/13/24  1054   WBC 6.53   RBC 5.63   HGB 18.2*   HCT 57.0*   .2*   MCH 32.3*   MCHC 31.9*   RDW 16.8      MPV 9.9       Recent Labs   Lab 06/13/24  1054      K 4.5   CL 93*   CO2 36*   BUN 7.0*   CREATININE 0.69*   CALCIUM 9.0   ALBUMIN 3.4   ALKPHOS 70   ALT 15   AST 19   BILITOT 1.0       Microbiology Results (last 7 days)       Procedure Component Value Units Date/Time    Blood culture #2 **CANNOT BE ORDERED STAT** [[id number]] Collected: 06/13/24 1208    Order Status: Sent Specimen: Blood Updated: 06/13/24 1217    Blood culture #1 **CANNOT BE ORDERED STAT** [[id number]] Collected: 06/13/24 1203    Order Status: Sent Specimen: Blood Updated: 06/13/24 1216             X-ray Chest AP Portable  Narrative: EXAMINATION:  XR CHEST AP PORTABLE    CLINICAL HISTORY:  Shortness of breath;, .    COMPARISON:  05/06/2019    FINDINGS:  An AP view or more reveals the heart to be normal in size.  The trachea is midline.  Atherosclerosis is seen within the aorta.  There is interim development of a hazy wedge-shaped opacity at the right infrahilar region.  Degenerative changes and mild curvature are noted to the thoracic spine.  Impression: 1. Interim hazy wedge-shaped opacity right infrahilar regions suggesting infiltrate and or atelectasis  2. Atherosclerosis  3. Thoracic spondylosis and scoliosis    Electronically signed by: Rashawn Elizalde  Date:    06/13/2024  Time:    11:30      ASSESSMENT & PLAN:     Right lower lobe pneumonia   Acute on chronic COPD exacerbation   End-stage COPD on home oxygen   Tobacco abuse-2 pack per day every day     Continue IV Zithromax and IV Rocephin   Continue IV Solu-Medrol  Continue DuoNeb and Pulmicort   Continue Lovenox for DVT prophylaxis   Check CBC BMP in a.m.      VTE Prophylaxis: will be placed on Heparin/Lovenox/ Xarelto/ SCD for DVT prophylaxis and will be advised to be as mobile as  possible and sit in a chair as tolerated    Patient condition:  Stable/Fair/Guarded/ Serious/ Critical    __________________________________________________________________________  INPATIENT LIST OF MEDICATIONS     Scheduled Meds:   azithromycin  500 mg Intravenous Q24H    budesonide  0.5 mg Nebulization Q12H    cefTRIAXone (Rocephin) IV (PEDS and ADULTS)  1 g Intravenous Q24H    enoxaparin  40 mg Subcutaneous Daily    methylPREDNISolone sodium succinate (SOLU-MEDROL) 60 mg in dextrose 5 % (D5W) 24 mL IV syringe (conc 2.5 mg/mL)  60 mg Intravenous Q6H    sodium chloride 0.9%  10 mL Intravenous Q8H     Continuous Infusions:  PRN Meds:.  Current Facility-Administered Medications:     acetaminophen, 650 mg, Oral, Q8H PRN    albuterol-ipratropium, 3 mL, Nebulization, Q6H PRN    cloNIDine, 0.2 mg, Oral, TID PRN      I, _ NP/PA have reviewed and discussed the case with  _   Please see the following addendum for further assessment and plan from there attending MD.    06/13/2024    ________________________________________________________________________________    MD Addendum:  Dr. NEMESIO ---assumed care of this patient today at ---am/pm  For the patient encounter, I performed the substantive portion of the visit, I reviewed the NP/PA documentation, treatment plan, and medical decision making.  I had face to face time with this patient     A. History:    B. Physical exam:    C. Medical decision making:    Discharge Planning and Disposition: No mobility needs. Ambulating well. Good social support system.   Anticipated discharge    If patient was admitted under observational status it is with my approval/permission.        All diagnosis and differential diagnosis have been reviewed; assessment and plan has been documented; I have personally reviewed the labs and test results that are presently available; I have reviewed the patients medication list; I have reviewed the consulting providers response and recommendations. I have  reviewed or attempted to review medical records based upon their availability.    All of the patient and family questions have been addressed and answered. Patient's is agreeable to the above stated plan. I will continue to monitor closely and make adjustments to medical management as needed.    If patient was admitted under observational status it is with my approval/permission.      Silvano Baker MD   06/13/2024

## 2024-06-13 NOTE — ED PROVIDER NOTES
Encounter Date: 6/13/2024  History from patient     History     Chief Complaint   Patient presents with    Shortness of Breath     C/o SOB for the past 4 days, hx of COPD     HPI    Andrea Vanessa is 64 y.o. male who  has a past medical history of BPH (benign prostatic hyperplasia), COPD (chronic obstructive pulmonary disease), GERD (gastroesophageal reflux disease), and Hypertension. arrives in ER with c/o Shortness of Breath (C/o SOB for the past 4 days, hx of COPD)    Review of patient's allergies indicates:  No Known Allergies  Past Medical History:   Diagnosis Date    BPH (benign prostatic hyperplasia)     COPD (chronic obstructive pulmonary disease)     GERD (gastroesophageal reflux disease)     Hypertension      Past Surgical History:   Procedure Laterality Date    Bilateral ear surgery Bilateral     ESOPHAGEAL DILATION       Family History   Problem Relation Name Age of Onset    COPD Mother      Heart disease Mother      Hypertension Father      Heart disease Father      Heart disease Sister       Social History     Tobacco Use    Smoking status: Every Day     Types: Cigarettes     Start date: 9/11/1974    Smokeless tobacco: Never   Substance Use Topics    Alcohol use: Yes     Comment: On weekends    Drug use: Never     Review of Systems   Constitutional:  Negative for fever.   HENT:  Negative for trouble swallowing and voice change.    Eyes:  Negative for visual disturbance.   Respiratory:  Positive for cough and shortness of breath.    Cardiovascular:  Negative for chest pain.   Gastrointestinal:  Negative for abdominal pain, diarrhea and vomiting.   Genitourinary:  Negative for dysuria and hematuria.   Musculoskeletal:  Negative for back pain and gait problem.   Skin:  Negative for color change and rash.   Neurological:  Negative for headaches.   Psychiatric/Behavioral:  Negative for behavioral problems and sleep disturbance.    All other systems reviewed and are negative.    Physical Exam     Initial  Vitals [06/13/24 1024]   BP Pulse Resp Temp SpO2   (!) 175/103 (!) 112 (!) 22 98.2 °F (36.8 °C) (!) 79 %      MAP       --         Physical Exam    Nursing note and vitals reviewed.  Constitutional: He appears well-developed. No distress.   HENT:   Head: Atraumatic.   Eyes: EOM are normal.   Neck: Neck supple.   Cardiovascular:  Normal heart sounds.   Tachycardia present.         Pulses:       Popliteal pulses are 2+ on the right side and 1+ on the left side.   Pulmonary/Chest: No respiratory distress. He has wheezes. He has rhonchi. He has rales.   Abdominal: Abdomen is soft. Bowel sounds are normal.   Musculoskeletal:         General: No edema. Normal range of motion.      Cervical back: Neck supple. No bony tenderness.     Neurological: He is alert.   Speech Normal   Skin: Skin is dry.   Psychiatric: He has a normal mood and affect.   Pleasant       ED Course   Procedures  Orders Placed This Encounter   Procedures    Blood culture #1 **CANNOT BE ORDERED STAT**    Blood culture #2 **CANNOT BE ORDERED STAT**    X-ray Chest AP Portable    Comprehensive metabolic panel    CBC auto differential    Troponin I    Brain natriuretic peptide    CBC with Differential    D dimer, quantitative    Lactic acid, plasma    Urinalysis, Reflex to Urine Culture    CBC auto differential    Comprehensive metabolic panel    Urinalysis, Microscopic    Cardiac Monitoring - Adult    Vital signs    Intake and output    Notify physician     Place sequential compression device    Full code    Oxygen Continuous    Pulse Oximetry Q4H    Inhalation Treatment Daily    Inhalation Treatment Q6H PRN    Cardiac monitoring strips    Cardiac monitoring strips    EKG 12-LEAD    Insert saline lock    Admit to Inpatient     Medications   cefTRIAXone (Rocephin) 1 g in dextrose 5 % in water (D5W) 100 mL IVPB (MB+) (0 g Intravenous Stopped 6/13/24 1243)   sodium chloride 0.9% flush 10 mL (has no administration in time range)   acetaminophen tablet 650 mg  (has no administration in time range)   cloNIDine tablet 0.2 mg (0.2 mg Oral Given 6/13/24 1321)   azithromycin (ZITHROMAX) 500 mg in dextrose 5 % (D5W) 250 mL IVPB (Vial-Mate) (has no administration in time range)   budesonide nebulizer solution 0.5 mg (has no administration in time range)   albuterol-ipratropium 2.5 mg-0.5 mg/3 mL nebulizer solution 3 mL (3 mLs Nebulization Given 6/13/24 1630)   enoxaparin injection 40 mg (has no administration in time range)   methylPREDNISolone sodium succinate injection 60 mg (has no administration in time range)   azithromycin (ZITHROMAX) 500 mg in dextrose 5 % (D5W) 250 mL IVPB (Vial-Mate) (0 mg Intravenous Stopped 6/13/24 1423)     Admission on 06/13/2024   Component Date Value Ref Range Status    Sodium 06/13/2024 138  136 - 145 mmol/L Final    Potassium 06/13/2024 4.5  3.5 - 5.1 mmol/L Final    Chloride 06/13/2024 93 (L)  98 - 107 mmol/L Final    CO2 06/13/2024 36 (H)  23 - 31 mmol/L Final    Glucose 06/13/2024 89  82 - 115 mg/dL Final    Blood Urea Nitrogen 06/13/2024 7.0 (L)  8.4 - 25.7 mg/dL Final    Creatinine 06/13/2024 0.69 (L)  0.73 - 1.18 mg/dL Final    Calcium 06/13/2024 9.0  8.8 - 10.0 mg/dL Final    Protein Total 06/13/2024 6.9  5.8 - 7.6 gm/dL Final    Albumin 06/13/2024 3.4  3.4 - 4.8 g/dL Final    Globulin 06/13/2024 3.5  2.4 - 3.5 gm/dL Final    Albumin/Globulin Ratio 06/13/2024 1.0 (L)  1.1 - 2.0 ratio Final    Bilirubin Total 06/13/2024 1.0  <=1.5 mg/dL Final    ALP 06/13/2024 70  40 - 150 unit/L Final    ALT 06/13/2024 15  0 - 55 unit/L Final    AST 06/13/2024 19  5 - 34 unit/L Final    eGFR 06/13/2024 >60  mL/min/1.73/m2 Final    Anion Gap 06/13/2024 9.0  mEq/L Final    BUN/Creatinine Ratio 06/13/2024 10   Final    Troponin-I 06/13/2024 0.024  0.000 - 0.045 ng/mL Final    Natriuretic Peptide 06/13/2024 887.0 (H)  <=100.0 pg/mL Final    QRS Duration 06/13/2024 86  ms Preliminary    [facility]HS QTC Calculation 06/13/2024 448  ms Preliminary    WBC 06/13/2024 6.53   4.50 - 11.50 x10(3)/mcL Final    RBC 06/13/2024 5.63  4.70 - 6.10 x10(6)/mcL Final    Hgb 06/13/2024 18.2 (H)  14.0 - 18.0 g/dL Final    Hct 06/13/2024 57.0 (H)  42.0 - 52.0 % Final    MCV 06/13/2024 101.2 (H)  80.0 - 94.0 fL Final    MCH 06/13/2024 32.3 (H)  27.0 - 31.0 pg Final    MCHC 06/13/2024 31.9 (L)  33.0 - 36.0 g/dL Final    RDW 06/13/2024 16.8  11.5 - 17.0 % Final    Platelet 06/13/2024 173  130 - 400 x10(3)/mcL Final    MPV 06/13/2024 9.9  7.4 - 10.4 fL Final    Neut % 06/13/2024 70.9  % Final    Lymph % 06/13/2024 14.5  % Final    Mono % 06/13/2024 10.4  % Final    Eos % 06/13/2024 2.0  % Final    Basophil % 06/13/2024 0.2  % Final    Lymph # 06/13/2024 0.95  0.6 - 4.6 x10(3)/mcL Final    Neut # 06/13/2024 4.63  2.1 - 9.2 x10(3)/mcL Final    Mono # 06/13/2024 0.68  0.1 - 1.3 x10(3)/mcL Final    Eos # 06/13/2024 0.13  0 - 0.9 x10(3)/mcL Final    Baso # 06/13/2024 0.01  <=0.2 x10(3)/mcL Final    IG# 06/13/2024 0.13 (H)  0 - 0.04 x10(3)/mcL Final    IG% 06/13/2024 2.0  % Final    D-Dimer 06/13/2024 0.48  0.00 - 0.50 ug/mL FEU Final    Lactic Acid Level 06/13/2024 1.3  0.5 - 2.2 mmol/L Final    Color, UA 06/13/2024 Dark Yellow  Yellow, Light-Yellow, Dark Yellow, Emily, Straw Final    Appearance, UA 06/13/2024 Slightly Cloudy (A)  Clear Final    Specific Gravity, UA 06/13/2024 1.015  1.005 - 1.030 Final    pH, UA 06/13/2024 6.5  5.0 - 8.5 Final    Protein, UA 06/13/2024 2+ (A)  Negative Final    Glucose, UA 06/13/2024 Negative  Negative, Normal Final    Ketones, UA 06/13/2024 Negative  Negative Final    Blood, UA 06/13/2024 Trace-Intact (A)  Negative Final    Bilirubin, UA 06/13/2024 1+ (A)  Negative Final    Urobilinogen, UA 06/13/2024 2.0 (A)  0.2, 1.0, Normal Final    Nitrites, UA 06/13/2024 Negative  Negative Final    Leukocyte Esterase, UA 06/13/2024 Negative  Negative Final    Bacteria, UA 06/13/2024 None Seen  None Seen, Rare, Occasional /HPF Final    RBC, UA 06/13/2024 0-2  None Seen, 0-2, 3-5, 0-5  /HPF Final    WBC, UA 06/13/2024 None Seen  None Seen, 0-2, 3-5, 0-5 /HPF Final    Squamous Epithelial Cells, UA 06/13/2024 Few (A)  None Seen, Rare, Occasional, Occ /HPF Final       Labs Reviewed   COMPREHENSIVE METABOLIC PANEL - Abnormal; Notable for the following components:       Result Value    Chloride 93 (*)     CO2 36 (*)     Blood Urea Nitrogen 7.0 (*)     Creatinine 0.69 (*)     Albumin/Globulin Ratio 1.0 (*)     All other components within normal limits   B-TYPE NATRIURETIC PEPTIDE - Abnormal; Notable for the following components:    Natriuretic Peptide 887.0 (*)     All other components within normal limits   CBC WITH DIFFERENTIAL - Abnormal; Notable for the following components:    Hgb 18.2 (*)     Hct 57.0 (*)     .2 (*)     MCH 32.3 (*)     MCHC 31.9 (*)     IG# 0.13 (*)     All other components within normal limits   URINALYSIS, REFLEX TO URINE CULTURE - Abnormal; Notable for the following components:    Appearance, UA Slightly Cloudy (*)     Protein, UA 2+ (*)     Blood, UA Trace-Intact (*)     Bilirubin, UA 1+ (*)     Urobilinogen, UA 2.0 (*)     All other components within normal limits   URINALYSIS, MICROSCOPIC - Abnormal; Notable for the following components:    Squamous Epithelial Cells, UA Few (*)     All other components within normal limits   TROPONIN I - Normal   D DIMER, QUANTITATIVE - Normal   LACTIC ACID, PLASMA - Normal   BLOOD CULTURE [facility]   BLOOD CULTURE [facility]   CBC W/ AUTO DIFFERENTIAL    Narrative:     The following orders were created for panel order CBC auto differential.  Procedure                               Abnormality         Status                     ---------                               -----------         ------                     CBC with Differential[[id number]]       Abnormal            Final result                 Please view results for these tests on the individual orders.        ECG Results              EKG 12-LEAD (Preliminary result)        Collection  Time Result Time QRS Duration [facility] QTC Calculation    06/13/24 10:29:32 06/13/24 11:43:32 86 448                     Wet Read by Baltazar Montez MD (06/13/24 11:45:01, [facility] - Emergency Dept, Emergency Medicine)    EKG: Independently reviewed and / or Interpreted by Baltazar Montez MD. independently as Normal Sinus Rhythm, Rate 106, sinus tachycardia, Right Axis Deviation, Q waves in anterior and septal leads,, No STEMI, T-wave inversion in lead 2, 3, aVL, V3, V4, and V5                       In process by Interface, Lab In [facility] (06/13/24 11:26:44)                   Narrative:    Test Reason : R06.02,    Vent. Rate : 106 BPM     Atrial Rate : 106 BPM     P-R Int : 152 ms          QRS Dur : 086 ms      QT Int : 338 ms       P-R-T Axes : 085 264 061 degrees     QTc Int : 448 ms    Sinus tachycardia  Right atrial enlargement  Right superior axis deviation  Anteroseptal infarct ,age undetermined  T wave abnormality, consider lateral ischemia  Abnormal ECG  No previous ECGs available    Referred By: [email or web address]   SELF           Confirmed By:                                   Imaging Results              X-ray Chest AP Portable (Final result)  Result time 06/13/24 11:30:17      Final result by Rashawn Elizalde MD (06/13/24 11:30:17)                   Impression:      1. Interim hazy wedge-shaped opacity right infrahilar regions suggesting infiltrate and or atelectasis  2. Atherosclerosis  3. Thoracic spondylosis and scoliosis      Electronically signed by: Rashawn Elizalde  Date:    06/13/2024  Time:    11:30               Narrative:    EXAMINATION:  XR CHEST AP PORTABLE    CLINICAL HISTORY:  Shortness of breath;, .    COMPARISON:  05/06/2019    FINDINGS:  An AP view or more reveals the heart to be normal in size.  The trachea is midline.  Atherosclerosis is seen within the aorta.  There is interim development of a hazy wedge-shaped opacity at the right infrahilar region.  Degenerative changes and mild  curvature are noted to the thoracic spine.                                       Medications   cefTRIAXone (Rocephin) 1 g in dextrose 5 % in water (D5W) 100 mL IVPB (MB+) (0 g Intravenous Stopped 6/13/24 1243)   sodium chloride 0.9% flush 10 mL (has no administration in time range)   acetaminophen tablet 650 mg (has no administration in time range)   cloNIDine tablet 0.2 mg (0.2 mg Oral Given 6/13/24 1321)   azithromycin (ZITHROMAX) 500 mg in dextrose 5 % (D5W) 250 mL IVPB (Vial-Mate) (has no administration in time range)   budesonide nebulizer solution 0.5 mg (has no administration in time range)   albuterol-ipratropium 2.5 mg-0.5 mg/3 mL nebulizer solution 3 mL (3 mLs Nebulization Given 6/13/24 1630)   enoxaparin injection 40 mg (has no administration in time range)   methylPREDNISolone sodium succinate injection 60 mg (has no administration in time range)   azithromycin (ZITHROMAX) 500 mg in dextrose 5 % (D5W) 250 mL IVPB (Vial-Mate) (0 mg Intravenous Stopped 6/13/24 1423)     Medical Decision Making    Andrea Vanessa is 64 y.o. male who  has a past medical history of BPH (benign prostatic hyperplasia), COPD (chronic obstructive pulmonary disease), GERD (gastroesophageal reflux disease), and Hypertension. arrives in ER with c/o Shortness of Breath (C/o SOB for the past 4 days, hx of COPD)    Patient's comes to the emergency room with complaint of shortness of breath, dyspnea on exertion, his sister says that when she came home yesterday he was purple in the face, he is using oxygen at home all the time, he has chronic respiratory failure, and he is on oxygen at home, he smokes cigarettes he says 1-1/2 pack now but he used to smoke 4 packs a day in the past, he drinks mainly when he gets his checked, and on weekends, patient denies any particular complaints, patient's sister is concerned that he might have fluid on the heart because 1 of her friends family member had fluid on the heart.  And she wants him to  be checked out for the fluid.  They went to the lung doctor last week and they were told that come back in 3 months, he is just taking his Anoro Ellipta and albuterol, he has not taking his blood pressure medicines anymore, because he feels that he does not needed, he has not taking his prostate medicines either.    Amount and/or Complexity of Data Reviewed  Labs: ordered.  Radiology: ordered.    Risk  OTC drugs.  Prescription drug management.  Decision regarding hospitalization.               ED Course as of 06/13/24 1637   Thu Jun 13, 2024   1201 Patient has a new right lower lobe infiltrate, that may be the reason for his worsening shortness of breath over the last week or so, he does have mild congestive heart failure, some minor EKG changes, his D-dimer is in normal range for his age, I am going to admit him in the hospital give him IV antibiotics for pneumonia, continue oxygen, he has not taking his blood pressure medicines anymore his blood pressure was elevated when he came in but it has come down on its own. [GQ]   1202 I talked to Dr. Baker, and is okay with admission [GQ]      ED Course User Index  [GQ] Baltazar Montez MD                           Clinical Impression:  Final diagnoses:  [R06.02] Shortness of breath  [J18.9] Pneumonia of right lower lobe due to infectious organism (Primary)  [J44.1] COPD exacerbation          ED Disposition Condition    Admit Stable                Baltazar Montez MD  06/13/24 5940

## 2024-06-14 LAB
ALBUMIN SERPL-MCNC: 3.2 G/DL (ref 3.4–4.8)
ALBUMIN/GLOB SERPL: 1 RATIO (ref 1.1–2)
ALP SERPL-CCNC: 60 UNIT/L (ref 40–150)
ALT SERPL-CCNC: 16 UNIT/L (ref 0–55)
ANION GAP SERPL CALC-SCNC: 4 MEQ/L
AST SERPL-CCNC: 18 UNIT/L (ref 5–34)
BASOPHILS # BLD AUTO: 0.02 X10(3)/MCL
BASOPHILS NFR BLD AUTO: 0.7 %
BILIRUB SERPL-MCNC: 0.7 MG/DL
BUN SERPL-MCNC: 11 MG/DL (ref 8.4–25.7)
CALCIUM SERPL-MCNC: 8.6 MG/DL (ref 8.8–10)
CHLORIDE SERPL-SCNC: 95 MMOL/L (ref 98–107)
CO2 SERPL-SCNC: 40 MMOL/L (ref 23–31)
CREAT SERPL-MCNC: 0.81 MG/DL (ref 0.73–1.18)
CREAT/UREA NIT SERPL: 14
EOSINOPHIL # BLD AUTO: 0 X10(3)/MCL (ref 0–0.9)
EOSINOPHIL NFR BLD AUTO: 0 %
ERYTHROCYTE [DISTWIDTH] IN BLOOD BY AUTOMATED COUNT: 15.5 % (ref 11.5–17)
GFR SERPLBLD CREATININE-BSD FMLA CKD-EPI: >60 ML/MIN/1.73/M2
GLOBULIN SER-MCNC: 3.2 GM/DL (ref 2.4–3.5)
GLUCOSE SERPL-MCNC: 121 MG/DL (ref 82–115)
HCT VFR BLD AUTO: 54.7 % (ref 42–52)
HGB BLD-MCNC: 16.9 G/DL (ref 14–18)
IMM GRANULOCYTES # BLD AUTO: 0.06 X10(3)/MCL (ref 0–0.04)
IMM GRANULOCYTES NFR BLD AUTO: 2 %
LYMPHOCYTES # BLD AUTO: 0.19 X10(3)/MCL (ref 0.6–4.6)
LYMPHOCYTES NFR BLD AUTO: 6.5 %
MCH RBC QN AUTO: 31.3 PG (ref 27–31)
MCHC RBC AUTO-ENTMCNC: 30.9 G/DL (ref 33–36)
MCV RBC AUTO: 101.3 FL (ref 80–94)
MONOCYTES # BLD AUTO: 0.09 X10(3)/MCL (ref 0.1–1.3)
MONOCYTES NFR BLD AUTO: 3.1 %
NEUTROPHILS # BLD AUTO: 2.57 X10(3)/MCL (ref 2.1–9.2)
NEUTROPHILS NFR BLD AUTO: 87.7 %
PLATELET # BLD AUTO: 165 X10(3)/MCL (ref 130–400)
PMV BLD AUTO: 9.5 FL (ref 7.4–10.4)
POTASSIUM SERPL-SCNC: 5.8 MMOL/L (ref 3.5–5.1)
PROT SERPL-MCNC: 6.4 GM/DL (ref 5.8–7.6)
RBC # BLD AUTO: 5.4 X10(6)/MCL (ref 4.7–6.1)
SODIUM SERPL-SCNC: 139 MMOL/L (ref 136–145)
WBC # SPEC AUTO: 2.93 X10(3)/MCL (ref 4.5–11.5)

## 2024-06-14 PROCEDURE — 63600175 PHARM REV CODE 636 W HCPCS: Performed by: INTERNAL MEDICINE

## 2024-06-14 PROCEDURE — 25000003 PHARM REV CODE 250: Performed by: INTERNAL MEDICINE

## 2024-06-14 PROCEDURE — 94761 N-INVAS EAR/PLS OXIMETRY MLT: CPT

## 2024-06-14 PROCEDURE — 85025 COMPLETE CBC W/AUTO DIFF WBC: CPT | Performed by: INTERNAL MEDICINE

## 2024-06-14 PROCEDURE — 27000221 HC OXYGEN, UP TO 24 HOURS

## 2024-06-14 PROCEDURE — 80053 COMPREHEN METABOLIC PANEL: CPT | Performed by: INTERNAL MEDICINE

## 2024-06-14 PROCEDURE — 11000001 HC ACUTE MED/SURG PRIVATE ROOM

## 2024-06-14 PROCEDURE — 94640 AIRWAY INHALATION TREATMENT: CPT

## 2024-06-14 PROCEDURE — 25000242 PHARM REV CODE 250 ALT 637 W/ HCPCS: Performed by: INTERNAL MEDICINE

## 2024-06-14 PROCEDURE — 36415 COLL VENOUS BLD VENIPUNCTURE: CPT | Performed by: INTERNAL MEDICINE

## 2024-06-14 RX ADMIN — IPRATROPIUM BROMIDE AND ALBUTEROL SULFATE 3 ML: .5; 3 SOLUTION RESPIRATORY (INHALATION) at 12:06

## 2024-06-14 RX ADMIN — METHYLPREDNISOLONE SODIUM SUCCINATE 60 MG: 40 INJECTION INTRAMUSCULAR; INTRAVENOUS at 06:06

## 2024-06-14 RX ADMIN — METHYLPREDNISOLONE SODIUM SUCCINATE 60 MG: 40 INJECTION INTRAMUSCULAR; INTRAVENOUS at 05:06

## 2024-06-14 RX ADMIN — ENOXAPARIN SODIUM 40 MG: 40 INJECTION SUBCUTANEOUS at 04:06

## 2024-06-14 RX ADMIN — IPRATROPIUM BROMIDE AND ALBUTEROL SULFATE 3 ML: .5; 3 SOLUTION RESPIRATORY (INHALATION) at 07:06

## 2024-06-14 RX ADMIN — IPRATROPIUM BROMIDE AND ALBUTEROL SULFATE 3 ML: .5; 3 SOLUTION RESPIRATORY (INHALATION) at 11:06

## 2024-06-14 RX ADMIN — IPRATROPIUM BROMIDE AND ALBUTEROL SULFATE 3 ML: .5; 3 SOLUTION RESPIRATORY (INHALATION) at 03:06

## 2024-06-14 RX ADMIN — BUDESONIDE 0.5 MG: 0.5 INHALANT RESPIRATORY (INHALATION) at 07:06

## 2024-06-14 RX ADMIN — CEFTRIAXONE SODIUM 1 G: 1 INJECTION, POWDER, FOR SOLUTION INTRAMUSCULAR; INTRAVENOUS at 11:06

## 2024-06-14 RX ADMIN — METHYLPREDNISOLONE SODIUM SUCCINATE 60 MG: 40 INJECTION INTRAMUSCULAR; INTRAVENOUS at 11:06

## 2024-06-14 RX ADMIN — AZITHROMYCIN MONOHYDRATE 500 MG: 500 INJECTION, POWDER, LYOPHILIZED, FOR SOLUTION INTRAVENOUS at 09:06

## 2024-06-14 RX ADMIN — METHYLPREDNISOLONE SODIUM SUCCINATE 60 MG: 40 INJECTION INTRAMUSCULAR; INTRAVENOUS at 01:06

## 2024-06-14 RX ADMIN — SODIUM ZIRCONIUM CYCLOSILICATE 10 G: 5 POWDER, FOR SUSPENSION ORAL at 09:06

## 2024-06-14 NOTE — PROGRESS NOTES
Hospital Medicine progress note      Patient Name: Andrea Vanessa  MRN: [id number]  Patient Class: IP- Inpatient   Admission Date: 6/13/2024   Admitting Physician: FORTINO Service   Length of Stay: 1  Attending Physician: Silvano Baker MD  Primary Care Provider: Willem Sanders MD  Face-to-Face encounter date: 06/14/2024  Code Status:  Chief Complaint: Shortness of Breath (C/o SOB for the past 4 days, hx of COPD)      Screening for Social Drivers for health:  Patient screened for food insecurity, housing instability, transportation needs, utility difficulties, and interpersonal safety (select all that apply as identified as concern)  []Housing or Food  []Transportation Needs  []Utility Difficulties  []Interpersonal safety  [x]None      Patient information was obtained from patient, patient's family, past medical records and ER records.  ED records were reviewed in detail and documented below    HISTORY OF PRESENT ILLNESS:   Andrea Vanessa is a 64 y.o. male who  has a past medical history of BPH (benign prostatic hyperplasia), COPD (chronic obstructive pulmonary disease), GERD (gastroesophageal reflux disease), and Hypertension..     A 64 years old male history of COPD on home oxygen presented to emergency room complain of shortness for breath and cough for 4 days     No fever, no chest pain, no nausea, no vomiting     Cough with the yellow and green sputum     Patient still smoke 2 packs a day every day     At emergency room, WBC 6, hemoglobin 18, BUN 7, creatinine 0.6,      Chest x-ray shows right lower lobe pneumonia     The patient was admitted to the hospital for acute on chronic COPD exacerbation secondary to right lower lobe pneumonia    June 14, 2024-still patient is hypoxic 4 L and 85%, no fever, no cough, no shortness for breath    PAST MEDICAL HISTORY:     Past Medical History:   Diagnosis Date    BPH (benign prostatic hyperplasia)     COPD (chronic obstructive pulmonary disease)     GERD  (gastroesophageal reflux disease)     Hypertension        PAST SURGICAL HISTORY:     Past Surgical History:   Procedure Laterality Date    Bilateral ear surgery Bilateral     ESOPHAGEAL DILATION         ALLERGIES:   Patient has no known allergies.    FAMILY HISTORY:   Reviewed and negative    SOCIAL HISTORY:     Social History     Tobacco Use    Smoking status: Every Day     Types: Cigarettes     Start date: 9/11/1974    Smokeless tobacco: Never   Substance Use Topics    Alcohol use: Yes     Comment: On weekends        HOME MEDICATIONS:     Prior to Admission medications    Medication Sig Start Date End Date Taking? Authorizing Provider   albuterol (PROVENTIL/VENTOLIN HFA) 90 mcg/actuation inhaler Inhale 2 puffs into the lungs every 6 (six) hours as needed for Wheezing. Rescue   Yes Provider, Historical   umeclidinium-vilanteroL (ANORO ELLIPTA) 62.5-25 mcg/actuation DsDv Inhale 1 puff into the lungs once daily. Controller 5/8/24  Yes Vasiliy Vale MD       REVIEW OF SYSTEMS:   Except as documented, all other systems reviewed and negative     PHYSICAL EXAM:     VITAL SIGNS: 24 HRS MIN & MAX LAST   Temp  Min: 97.4 °F (36.3 °C)  Max: 97.8 °F (36.6 °C) 97.4 °F (36.3 °C)   BP  Min: 102/67  Max: 156/102 108/69   Pulse  Min: 72  Max: 98  83   Resp  Min: 17  Max: 26 20   SpO2  Min: 85 %  Max: 100 % (!) 94 %     General appearance: Well-developed, well-nourished male in no apparent distress.  HENT: Atraumatic head. Moist mucous membranes of oral cavity.  Eyes: Normal extraocular movements.   Neck: Supple.   Lungs:  Bilateral wheezing  Heart: Regular rate and rhythm. S1 and S2 present with no murmurs/gallop/rub. No pedal edema. No JVD present.   Abdomen: Soft, non-distended, non-tender. No rebound tenderness/guarding. Bowel sounds are normal.   Extremities: No cyanosis, clubbing, or edema.  Skin: No Rash.   Neuro: Motor and sensory exams grossly intact. Good tone. Muscle strength 5/5 in all 4 extremities  Psych/mental  status: Appropriate mood and affect. Responds appropriately to questions.     LABS AND IMAGING:     Recent Labs   Lab 06/13/24  1054 06/14/24  0452   WBC 6.53 2.93*   RBC 5.63 5.40   HGB 18.2* 16.9   HCT 57.0* 54.7*   .2* 101.3*   MCH 32.3* 31.3*   MCHC 31.9* 30.9*   RDW 16.8 15.5    165   MPV 9.9 9.5       Recent Labs   Lab 06/13/24  1054 06/14/24  0452    139   K 4.5 5.8*   CL 93* 95*   CO2 36* 40*   BUN 7.0* 11.0   CREATININE 0.69* 0.81   CALCIUM 9.0 8.6*   ALBUMIN 3.4 3.2*   ALKPHOS 70 60   ALT 15 16   AST 19 18   BILITOT 1.0 0.7       Microbiology Results (last 7 days)       Procedure Component Value Units Date/Time    Blood culture #2 **CANNOT BE ORDERED STAT** [[id number]] Collected: 06/13/24 1208    Order Status: Resulted Specimen: Blood Updated: 06/13/24 1217    Blood culture #1 **CANNOT BE ORDERED STAT** [[id number]] Collected: 06/13/24 1203    Order Status: Resulted Specimen: Blood Updated: 06/13/24 1216             X-ray Chest AP Portable  Narrative: EXAMINATION:  XR CHEST AP PORTABLE    CLINICAL HISTORY:  Shortness of breath;, .    COMPARISON:  05/06/2019    FINDINGS:  An AP view or more reveals the heart to be normal in size.  The trachea is midline.  Atherosclerosis is seen within the aorta.  There is interim development of a hazy wedge-shaped opacity at the right infrahilar region.  Degenerative changes and mild curvature are noted to the thoracic spine.  Impression: 1. Interim hazy wedge-shaped opacity right infrahilar regions suggesting infiltrate and or atelectasis  2. Atherosclerosis  3. Thoracic spondylosis and scoliosis    Electronically signed by: Rashawn Elizalde  Date:    06/13/2024  Time:    11:30      ASSESSMENT & PLAN:     Right lower lobe pneumonia   Acute on chronic COPD exacerbation   End-stage COPD on home oxygen   Tobacco abuse-2 pack per day every day   Hyperkalemia   Acute on chronic respiratory failure    Continue IV Zithromax and IV Rocephin   Continue IV  Solu-Medrol  Continue DuoNeb and Pulmicort   Continue Lovenox for DVT prophylaxis   Mina p.r.n.  Check CBC BMP in a.m.      VTE Prophylaxis: will be placed on Heparin/Lovenox/ Xarelto/ SCD for DVT prophylaxis and will be advised to be as mobile as possible and sit in a chair as tolerated    Patient condition:  Stable/Fair/Guarded/ Serious/ Critical    __________________________________________________________________________  INPATIENT LIST OF MEDICATIONS     Scheduled Meds:   azithromycin  500 mg Intravenous Q24H    budesonide  0.5 mg Nebulization Q12H    cefTRIAXone (Rocephin) IV (PEDS and ADULTS)  1 g Intravenous Q24H    enoxaparin  40 mg Subcutaneous Daily    methylPREDNISolone sodium succinate injection  60 mg Intravenous Q6H    sodium chloride 0.9%  10 mL Intravenous Q8H     Continuous Infusions:  PRN Meds:.  Current Facility-Administered Medications:     acetaminophen, 650 mg, Oral, Q8H PRN    albuterol-ipratropium, 3 mL, Nebulization, Q6H PRN    cloNIDine, 0.2 mg, Oral, TID PRN      I, _ NP/PA have reviewed and discussed the case with  _   Please see the following addendum for further assessment and plan from there attending MD.    06/14/2024    ________________________________________________________________________________    MD Addendum:  Dr. NEMESIO ---assumed care of this patient today at ---am/pm  For the patient encounter, I performed the substantive portion of the visit, I reviewed the NP/PA documentation, treatment plan, and medical decision making.  I had face to face time with this patient     A. History:    B. Physical exam:    C. Medical decision making:    Discharge Planning and Disposition: No mobility needs. Ambulating well. Good social support system.   Anticipated discharge    If patient was admitted under observational status it is with my approval/permission.        All diagnosis and differential diagnosis have been reviewed; assessment and plan has been documented; I have personally  reviewed the labs and test results that are presently available; I have reviewed the patients medication list; I have reviewed the consulting providers response and recommendations. I have reviewed or attempted to review medical records based upon their availability.    All of the patient and family questions have been addressed and answered. Patient's is agreeable to the above stated plan. I will continue to monitor closely and make adjustments to medical management as needed.    If patient was admitted under observational status it is with my approval/permission.      Silvano Baker MD   06/14/2024

## 2024-06-15 VITALS
HEART RATE: 94 BPM | OXYGEN SATURATION: 92 % | DIASTOLIC BLOOD PRESSURE: 74 MMHG | HEIGHT: 64 IN | RESPIRATION RATE: 22 BRPM | WEIGHT: 140 LBS | SYSTOLIC BLOOD PRESSURE: 114 MMHG | BODY MASS INDEX: 23.9 KG/M2 | TEMPERATURE: 98 F

## 2024-06-15 LAB
ANION GAP SERPL CALC-SCNC: 2 MEQ/L
BASOPHILS # BLD AUTO: 0.01 X10(3)/MCL
BASOPHILS NFR BLD AUTO: 0.1 %
BUN SERPL-MCNC: 21 MG/DL (ref 8.4–25.7)
CALCIUM SERPL-MCNC: 8.8 MG/DL (ref 8.8–10)
CHLORIDE SERPL-SCNC: 93 MMOL/L (ref 98–107)
CO2 SERPL-SCNC: 39 MMOL/L (ref 23–31)
CREAT SERPL-MCNC: 0.68 MG/DL (ref 0.73–1.18)
CREAT/UREA NIT SERPL: 31
EOSINOPHIL # BLD AUTO: 0 X10(3)/MCL (ref 0–0.9)
EOSINOPHIL NFR BLD AUTO: 0 %
ERYTHROCYTE [DISTWIDTH] IN BLOOD BY AUTOMATED COUNT: 15.4 % (ref 11.5–17)
GFR SERPLBLD CREATININE-BSD FMLA CKD-EPI: >60 ML/MIN/1.73/M2
GLUCOSE SERPL-MCNC: 136 MG/DL (ref 82–115)
HCT VFR BLD AUTO: 48.9 % (ref 42–52)
HGB BLD-MCNC: 15.3 G/DL (ref 14–18)
IMM GRANULOCYTES # BLD AUTO: 0.09 X10(3)/MCL (ref 0–0.04)
IMM GRANULOCYTES NFR BLD AUTO: 0.9 %
LYMPHOCYTES # BLD AUTO: 0.22 X10(3)/MCL (ref 0.6–4.6)
LYMPHOCYTES NFR BLD AUTO: 2.2 %
MCH RBC QN AUTO: 31.2 PG (ref 27–31)
MCHC RBC AUTO-ENTMCNC: 31.3 G/DL (ref 33–36)
MCV RBC AUTO: 99.6 FL (ref 80–94)
MONOCYTES # BLD AUTO: 0.45 X10(3)/MCL (ref 0.1–1.3)
MONOCYTES NFR BLD AUTO: 4.5 %
NEUTROPHILS # BLD AUTO: 9.22 X10(3)/MCL (ref 2.1–9.2)
NEUTROPHILS NFR BLD AUTO: 92.3 %
PLATELET # BLD AUTO: 163 X10(3)/MCL (ref 130–400)
PMV BLD AUTO: 9.9 FL (ref 7.4–10.4)
POTASSIUM SERPL-SCNC: 5 MMOL/L (ref 3.5–5.1)
RBC # BLD AUTO: 4.91 X10(6)/MCL (ref 4.7–6.1)
SODIUM SERPL-SCNC: 134 MMOL/L (ref 136–145)
WBC # BLD AUTO: 9.99 X10(3)/MCL (ref 4.5–11.5)

## 2024-06-15 PROCEDURE — 94761 N-INVAS EAR/PLS OXIMETRY MLT: CPT

## 2024-06-15 PROCEDURE — 25000242 PHARM REV CODE 250 ALT 637 W/ HCPCS: Performed by: INTERNAL MEDICINE

## 2024-06-15 PROCEDURE — 85025 COMPLETE CBC W/AUTO DIFF WBC: CPT | Performed by: INTERNAL MEDICINE

## 2024-06-15 PROCEDURE — 63600175 PHARM REV CODE 636 W HCPCS: Performed by: INTERNAL MEDICINE

## 2024-06-15 PROCEDURE — 94640 AIRWAY INHALATION TREATMENT: CPT

## 2024-06-15 PROCEDURE — 27000221 HC OXYGEN, UP TO 24 HOURS

## 2024-06-15 PROCEDURE — 80048 BASIC METABOLIC PNL TOTAL CA: CPT | Performed by: INTERNAL MEDICINE

## 2024-06-15 PROCEDURE — 36415 COLL VENOUS BLD VENIPUNCTURE: CPT | Performed by: INTERNAL MEDICINE

## 2024-06-15 PROCEDURE — 25000003 PHARM REV CODE 250: Performed by: INTERNAL MEDICINE

## 2024-06-15 RX ORDER — PREDNISONE 50 MG/1
50 TABLET ORAL DAILY
Qty: 5 TABLET | Refills: 0 | Status: SHIPPED | OUTPATIENT
Start: 2024-06-15 | End: 2024-06-20

## 2024-06-15 RX ORDER — GUAIFENESIN 100 MG/5ML
200 SOLUTION ORAL EVERY 4 HOURS PRN
Status: DISCONTINUED | OUTPATIENT
Start: 2024-06-15 | End: 2024-06-15 | Stop reason: HOSPADM

## 2024-06-15 RX ORDER — LEVOFLOXACIN 500 MG/1
500 TABLET, FILM COATED ORAL DAILY
Qty: 7 TABLET | Refills: 0 | Status: SHIPPED | OUTPATIENT
Start: 2024-06-15 | End: 2024-06-22

## 2024-06-15 RX ADMIN — AZITHROMYCIN MONOHYDRATE 500 MG: 500 INJECTION, POWDER, LYOPHILIZED, FOR SOLUTION INTRAVENOUS at 10:06

## 2024-06-15 RX ADMIN — BUDESONIDE 0.5 MG: 0.5 INHALANT RESPIRATORY (INHALATION) at 07:06

## 2024-06-15 RX ADMIN — IPRATROPIUM BROMIDE AND ALBUTEROL SULFATE 3 ML: .5; 3 SOLUTION RESPIRATORY (INHALATION) at 11:06

## 2024-06-15 RX ADMIN — METHYLPREDNISOLONE SODIUM SUCCINATE 60 MG: 40 INJECTION INTRAMUSCULAR; INTRAVENOUS at 12:06

## 2024-06-15 RX ADMIN — METHYLPREDNISOLONE SODIUM SUCCINATE 60 MG: 40 INJECTION INTRAMUSCULAR; INTRAVENOUS at 06:06

## 2024-06-15 RX ADMIN — IPRATROPIUM BROMIDE AND ALBUTEROL SULFATE 3 ML: .5; 3 SOLUTION RESPIRATORY (INHALATION) at 01:06

## 2024-06-15 RX ADMIN — IPRATROPIUM BROMIDE AND ALBUTEROL SULFATE 3 ML: .5; 3 SOLUTION RESPIRATORY (INHALATION) at 07:06

## 2024-06-15 NOTE — DISCHARGE SUMMARY
Hospital Medicine discharge summary      Patient Name: Andrea Vanessa  MRN: [id number]  Patient Class: IP- Inpatient   Admission Date: 6/13/2024   Admitting Physician: [facility]   Length of Stay: 2  Attending Physician: Silvano Baker MD  Primary Care Provider: Willem Sanders MD  Face-to-Face encounter date: 06/15/2024  Code Status:  Chief Complaint: Shortness of Breath (C/o SOB for the past 4 days, hx of COPD)      Screening for Social Drivers for health:  Patient screened for food insecurity, housing instability, transportation needs, utility difficulties, and interpersonal safety (select all that apply as identified as concern)  []Housing or Food  []Transportation Needs  []Utility Difficulties  []Interpersonal safety  [x]None      Patient information was obtained from patient, patient's family, past medical records and ER records.  ED records were reviewed in detail and documented below    HISTORY OF PRESENT ILLNESS:   Andrea Vanessa is a 64 y.o. male who  has a past medical history of BPH (benign prostatic hyperplasia), COPD (chronic obstructive pulmonary disease), GERD (gastroesophageal reflux disease), and Hypertension..     A 64 years old male history of COPD on home oxygen presented to emergency room complain of shortness for breath and cough for 4 days     No fever, no chest pain, no nausea, no vomiting     Cough with the yellow and green sputum     Patient still smoke 2 packs a day every day     At emergency room, WBC 6, hemoglobin 18, BUN 7, creatinine 0.6,      Chest x-ray shows right lower lobe pneumonia     The patient was admitted to the hospital for acute on chronic COPD exacerbation secondary to right lower lobe pneumonia    June 14, 2024-still patient is hypoxic 4 L and 85%, no fever, no cough, no shortness for breath    Mami 15, 2024-the patient felt much better, patient can maintain 4 L OxyMask with a 95% O2 sats, potassium improved to 5.0 from 5.8    Patient can go home today  and have a follow-up with the primary care doctor     I gave the patient Levaquin and prednisone prescription    PAST MEDICAL HISTORY:     Past Medical History:   Diagnosis Date    BPH (benign prostatic hyperplasia)     COPD (chronic obstructive pulmonary disease)     GERD (gastroesophageal reflux disease)     Hypertension        PAST SURGICAL HISTORY:     Past Surgical History:   Procedure Laterality Date    Bilateral ear surgery Bilateral     ESOPHAGEAL DILATION         ALLERGIES:   Patient has no known allergies.    FAMILY HISTORY:   Reviewed and negative    SOCIAL HISTORY:     Social History     Tobacco Use    Smoking status: Every Day     Types: Cigarettes     Start date: 9/11/1974    Smokeless tobacco: Never   Substance Use Topics    Alcohol use: Yes     Comment: On weekends        HOME MEDICATIONS:     Prior to Admission medications    Medication Sig Start Date End Date Taking? Authorizing Provider   albuterol (PROVENTIL/VENTOLIN HFA) 90 mcg/actuation inhaler Inhale 2 puffs into the lungs every 6 (six) hours as needed for Wheezing. Rescue   Yes Provider, Historical   umeclidinium-vilanteroL (ANORO ELLIPTA) 62.5-25 mcg/actuation DsDv Inhale 1 puff into the lungs once daily. Controller 5/8/24  Yes Vasiliy Vale MD       REVIEW OF SYSTEMS:   Except as documented, all other systems reviewed and negative     PHYSICAL EXAM:     VITAL SIGNS: 24 HRS MIN & MAX LAST   Temp  Min: 97.6 °F (36.4 °C)  Max: 98.1 °F (36.7 °C) 97.6 °F (36.4 °C)   BP  Min: 100/58  Max: 117/69 111/69   Pulse  Min: 79  Max: 94  85   Resp  Min: 18  Max: 20 20   SpO2  Min: 85 %  Max: 98 % (!) 90 %     General appearance: Well-developed, well-nourished male in no apparent distress.  HENT: Atraumatic head. Moist mucous membranes of oral cavity.  Eyes: Normal extraocular movements.   Neck: Supple.   Lungs:  Bilateral wheezing  Heart: Regular rate and rhythm. S1 and S2 present with no murmurs/gallop/rub. No pedal edema. No JVD present.   Abdomen:  Soft, non-distended, non-tender. No rebound tenderness/guarding. Bowel sounds are normal.   Extremities: No cyanosis, clubbing, or edema.  Skin: No Rash.   Neuro: Motor and sensory exams grossly intact. Good tone. Muscle strength 5/5 in all 4 extremities  Psych/mental status: Appropriate mood and affect. Responds appropriately to questions.     LABS AND IMAGING:     Recent Labs   Lab 06/13/24  1054 06/14/24 0452 06/15/24  0355   WBC 6.53 2.93* 9.99   RBC 5.63 5.40 4.91   HGB 18.2* 16.9 15.3   HCT 57.0* 54.7* 48.9   .2* 101.3* 99.6*   MCH 32.3* 31.3* 31.2*   MCHC 31.9* 30.9* 31.3*   RDW 16.8 15.5 15.4    165 163   MPV 9.9 9.5 9.9       Recent Labs   Lab 06/13/24  1054 06/14/24 0452 06/15/24  0355    139 134*   K 4.5 5.8* 5.0   CL 93* 95* 93*   CO2 36* 40* 39*   BUN 7.0* 11.0 21.0   CREATININE 0.69* 0.81 0.68*   CALCIUM 9.0 8.6* 8.8   ALBUMIN 3.4 3.2*  --    ALKPHOS 70 60  --    ALT 15 16  --    AST 19 18  --    BILITOT 1.0 0.7  --        Microbiology Results (last 7 days)       Procedure Component Value Units Date/Time    Blood culture #1 **CANNOT BE ORDERED STAT** [[id number]]  (Normal) Collected: 06/13/24 1203    Order Status: Completed Specimen: Blood Updated: 06/14/24 2001     Blood Culture No Growth At 24 Hours    Blood culture #2 **CANNOT BE ORDERED STAT** [[id number]]  (Normal) Collected: 06/13/24 1208    Order Status: Completed Specimen: Blood Updated: 06/14/24 2001     Blood Culture No Growth At 24 Hours             X-ray Chest AP Portable  Narrative: EXAMINATION:  XR CHEST AP PORTABLE    CLINICAL HISTORY:  Shortness of breath;, .    COMPARISON:  05/06/2019    FINDINGS:  An AP view or more reveals the heart to be normal in size.  The trachea is midline.  Atherosclerosis is seen within the aorta.  There is interim development of a hazy wedge-shaped opacity at the right infrahilar region.  Degenerative changes and mild curvature are noted to the thoracic spine.  Impression: 1. Interim  hazy wedge-shaped opacity right infrahilar regions suggesting infiltrate and or atelectasis  2. Atherosclerosis  3. Thoracic spondylosis and scoliosis    Electronically signed by: Rashawn Elizalde  Date:    06/13/2024  Time:    11:30      ASSESSMENT & PLAN:   Discharge diagnosis  Right lower lobe pneumonia   Acute on chronic COPD exacerbation   End-stage COPD on home oxygen   Tobacco abuse-2 pack per day every day   Hyperkalemia   Acute on chronic respiratory failure    Discharge condition-stable     Discharge destination-home-discharge time 30 minutes          VTE Prophylaxis: will be placed on Heparin/Lovenox/ Xarelto/ SCD for DVT prophylaxis and will be advised to be as mobile as possible and sit in a chair as tolerated    Patient condition:  Stable/Fair/Guarded/ Serious/ Critical    __________________________________________________________________________  INPATIENT LIST OF MEDICATIONS     Scheduled Meds:   azithromycin  500 mg Intravenous Q24H    budesonide  0.5 mg Nebulization Q12H    cefTRIAXone (Rocephin) IV (PEDS and ADULTS)  1 g Intravenous Q24H    enoxaparin  40 mg Subcutaneous Daily    methylPREDNISolone sodium succinate injection  60 mg Intravenous Q6H    sodium chloride 0.9%  10 mL Intravenous Q8H     Continuous Infusions:  PRN Meds:.  Current Facility-Administered Medications:     acetaminophen, 650 mg, Oral, Q8H PRN    albuterol-ipratropium, 3 mL, Nebulization, Q6H PRN    cloNIDine, 0.2 mg, Oral, TID PRN    guaiFENesin 100 mg/5 ml, 200 mg, Oral, Q4H PRN      I, _ NP/PA have reviewed and discussed the case with  _   Please see the following addendum for further assessment and plan from there attending MD.    06/15/2024    ________________________________________________________________________________    MD Addendum:  Dr. NEMESIO ---assumed care of this patient today at ---am/pm  For the patient encounter, I performed the substantive portion of the visit, I reviewed the NP/PA documentation, treatment  plan, and medical decision making.  I had face to face time with this patient     A. History:    B. Physical exam:    C. Medical decision making:    Discharge Planning and Disposition: No mobility needs. Ambulating well. Good social support system.   Anticipated discharge    If patient was admitted under observational status it is with my approval/permission.        All diagnosis and differential diagnosis have been reviewed; assessment and plan has been documented; I have personally reviewed the labs and test results that are presently available; I have reviewed the patients medication list; I have reviewed the consulting providers response and recommendations. I have reviewed or attempted to review medical records based upon their availability.    All of the patient and family questions have been addressed and answered. Patient's is agreeable to the above stated plan. I will continue to monitor closely and make adjustments to medical management as needed.    If patient was admitted under observational status it is with my approval/permission.      Silvano Baker MD   06/15/2024

## 2024-06-18 LAB
BACTERIA BLD CULT: NORMAL
BACTERIA BLD CULT: NORMAL

## 2024-07-22 ENCOUNTER — HOSPITAL ENCOUNTER (OUTPATIENT)
Dept: RADIOLOGY | Facility: HOSPITAL | Age: 64
Discharge: HOME | End: 2024-07-22
Attending: NURSE PRACTITIONER
Payer: COMMERCIAL

## 2024-07-22 DIAGNOSIS — J44.9 CHRONIC OBSTRUCTIVE PULMONARY DISEASE, UNSPECIFIED COPD TYPE: ICD-10-CM

## 2024-07-22 PROCEDURE — 71046 X-RAY EXAM CHEST 2 VIEWS: CPT | Mod: TC

## 2024-08-05 ENCOUNTER — LAB VISIT (OUTPATIENT)
Dept: LAB | Facility: HOSPITAL | Age: 64
End: 2024-08-05
Attending: HOSPITALIST
Payer: COMMERCIAL

## 2024-08-05 LAB
ALLENS TEST: ABNORMAL
DELSYS: ABNORMAL
HCO3 UR-SCNC: 33.1 MMOL/L (ref 24–28)
PCO2 BLDA: 64.9 MMHG (ref 35–45)
PH SMN: 7.32 [PH] (ref 7.35–7.45)
PO2 BLDA: 58 MMHG (ref 80–100)
POC BE: 7 MMOL/L
POC SATURATED O2: 86 % (ref 95–100)
POC TCO2: 35 MMOL/L (ref 23–27)
SAMPLE: ABNORMAL
SITE: ABNORMAL

## 2024-08-05 PROCEDURE — 82803 BLOOD GASES ANY COMBINATION: CPT

## 2024-08-05 PROCEDURE — 36600 WITHDRAWAL OF ARTERIAL BLOOD: CPT

## 2024-08-05 PROCEDURE — 99900035 HC TECH TIME PER 15 MIN (STAT)

## 2024-09-16 PROBLEM — J18.9 PNEUMONIA OF RIGHT LOWER LOBE DUE TO INFECTIOUS ORGANISM: Status: RESOLVED | Noted: 2024-06-13 | Resolved: 2024-09-16

## 2024-09-16 PROBLEM — J96.11 CHRONIC RESPIRATORY FAILURE WITH HYPOXIA: Status: RESOLVED | Noted: 2024-06-13 | Resolved: 2024-09-16

## 2025-01-30 ENCOUNTER — LAB VISIT (OUTPATIENT)
Dept: LAB | Facility: HOSPITAL | Age: 65
End: 2025-01-30
Attending: PEDIATRICS
Payer: COMMERCIAL

## 2025-01-30 DIAGNOSIS — R94.8 ABNORMAL RESULTS OF FUNCTION STUDIES OF OTHER ORGANS AND SYSTEMS: ICD-10-CM

## 2025-01-30 DIAGNOSIS — E55.9 VITAMIN D DEFICIENCY: ICD-10-CM

## 2025-01-30 DIAGNOSIS — R79.89 OTHER SPECIFIED ABNORMAL FINDINGS OF BLOOD CHEMISTRY: ICD-10-CM

## 2025-01-30 DIAGNOSIS — Z12.5 SPECIAL SCREENING FOR MALIGNANT NEOPLASM OF PROSTATE: ICD-10-CM

## 2025-01-30 DIAGNOSIS — E78.00 HIGH CHOLESTEROL: Primary | ICD-10-CM

## 2025-01-30 LAB
25(OH)D3+25(OH)D2 SERPL-MCNC: 61 NG/ML (ref 30–80)
ALBUMIN SERPL-MCNC: 3.6 G/DL (ref 3.4–4.8)
ALBUMIN/GLOB SERPL: 1 RATIO (ref 1.1–2)
ALP SERPL-CCNC: 69 UNIT/L (ref 40–150)
ALT SERPL-CCNC: 17 UNIT/L (ref 0–55)
ANION GAP SERPL CALC-SCNC: 9 MEQ/L
AST SERPL-CCNC: 19 UNIT/L (ref 5–34)
BACTERIA #/AREA URNS AUTO: NORMAL /HPF
BASOPHILS # BLD AUTO: 0.04 X10(3)/MCL
BASOPHILS NFR BLD AUTO: 0.7 %
BILIRUB SERPL-MCNC: 0.4 MG/DL
BILIRUB UR QL STRIP.AUTO: NEGATIVE
BUN SERPL-MCNC: 10 MG/DL (ref 8.4–25.7)
CALCIUM SERPL-MCNC: 9.3 MG/DL (ref 8.8–10)
CHLORIDE SERPL-SCNC: 102 MMOL/L (ref 98–107)
CHOLEST SERPL-MCNC: 191 MG/DL
CHOLEST/HDLC SERPL: 6 {RATIO} (ref 0–5)
CLARITY UR: CLEAR
CO2 SERPL-SCNC: 29 MMOL/L (ref 23–31)
COLOR UR AUTO: YELLOW
CREAT SERPL-MCNC: 0.75 MG/DL (ref 0.72–1.25)
CREAT/UREA NIT SERPL: 13
EOSINOPHIL # BLD AUTO: 0.25 X10(3)/MCL (ref 0–0.9)
EOSINOPHIL NFR BLD AUTO: 4.2 %
ERYTHROCYTE [DISTWIDTH] IN BLOOD BY AUTOMATED COUNT: 13 % (ref 11.5–17)
EST. AVERAGE GLUCOSE BLD GHB EST-MCNC: 91.1 MG/DL
GFR SERPLBLD CREATININE-BSD FMLA CKD-EPI: >60 ML/MIN/1.73/M2
GLOBULIN SER-MCNC: 3.7 GM/DL (ref 2.4–3.5)
GLUCOSE SERPL-MCNC: 86 MG/DL (ref 82–115)
GLUCOSE UR QL STRIP: NEGATIVE
HBA1C MFR BLD: 4.8 %
HCT VFR BLD AUTO: 56.6 % (ref 42–52)
HDLC SERPL-MCNC: 32 MG/DL (ref 35–60)
HGB BLD-MCNC: 18.2 G/DL (ref 14–18)
HGB UR QL STRIP: ABNORMAL
IMM GRANULOCYTES # BLD AUTO: 0.02 X10(3)/MCL (ref 0–0.04)
IMM GRANULOCYTES NFR BLD AUTO: 0.3 %
KETONES UR QL STRIP: NEGATIVE
LDLC SERPL CALC-MCNC: 137 MG/DL (ref 50–140)
LEUKOCYTE ESTERASE UR QL STRIP: NEGATIVE
LYMPHOCYTES # BLD AUTO: 1.4 X10(3)/MCL (ref 0.6–4.6)
LYMPHOCYTES NFR BLD AUTO: 23.5 %
MCH RBC QN AUTO: 30.3 PG (ref 27–31)
MCHC RBC AUTO-ENTMCNC: 32.2 G/DL (ref 33–36)
MCV RBC AUTO: 94.2 FL (ref 80–94)
MONOCYTES # BLD AUTO: 0.6 X10(3)/MCL (ref 0.1–1.3)
MONOCYTES NFR BLD AUTO: 10.1 %
NEUTROPHILS # BLD AUTO: 3.64 X10(3)/MCL (ref 2.1–9.2)
NEUTROPHILS NFR BLD AUTO: 61.2 %
NITRITE UR QL STRIP: NEGATIVE
PH UR STRIP: 6.5 [PH]
PLATELET # BLD AUTO: 261 X10(3)/MCL (ref 130–400)
PMV BLD AUTO: 9.3 FL (ref 7.4–10.4)
POTASSIUM SERPL-SCNC: 4.3 MMOL/L (ref 3.5–5.1)
PROT SERPL-MCNC: 7.3 GM/DL (ref 5.8–7.6)
PROT UR QL STRIP: ABNORMAL
PSA SERPL-MCNC: 3.65 NG/ML
RBC # BLD AUTO: 6.01 X10(6)/MCL (ref 4.7–6.1)
RBC #/AREA URNS AUTO: NORMAL /HPF
SODIUM SERPL-SCNC: 140 MMOL/L (ref 136–145)
SP GR UR STRIP.AUTO: 1.01 (ref 1–1.03)
SQUAMOUS #/AREA URNS AUTO: NORMAL /HPF
T4 FREE SERPL-MCNC: 1.18 NG/DL (ref 0.7–1.48)
TRIGL SERPL-MCNC: 108 MG/DL (ref 34–140)
TSH SERPL-ACNC: 0.9 UIU/ML (ref 0.35–4.94)
UROBILINOGEN UR STRIP-ACNC: 0.2
VLDLC SERPL CALC-MCNC: 22 MG/DL
WBC # BLD AUTO: 5.95 X10(3)/MCL (ref 4.5–11.5)
WBC #/AREA URNS AUTO: NORMAL /HPF

## 2025-01-30 PROCEDURE — 83036 HEMOGLOBIN GLYCOSYLATED A1C: CPT

## 2025-01-30 PROCEDURE — 85025 COMPLETE CBC W/AUTO DIFF WBC: CPT

## 2025-01-30 PROCEDURE — 81003 URINALYSIS AUTO W/O SCOPE: CPT

## 2025-01-30 PROCEDURE — 80053 COMPREHEN METABOLIC PANEL: CPT

## 2025-01-30 PROCEDURE — 84153 ASSAY OF PSA TOTAL: CPT

## 2025-01-30 PROCEDURE — 84443 ASSAY THYROID STIM HORMONE: CPT

## 2025-01-30 PROCEDURE — 36415 COLL VENOUS BLD VENIPUNCTURE: CPT

## 2025-01-30 PROCEDURE — 80061 LIPID PANEL: CPT

## 2025-01-30 PROCEDURE — 82306 VITAMIN D 25 HYDROXY: CPT

## 2025-01-30 PROCEDURE — 84439 ASSAY OF FREE THYROXINE: CPT

## 2025-01-31 ENCOUNTER — LAB VISIT (OUTPATIENT)
Dept: LAB | Facility: HOSPITAL | Age: 65
End: 2025-01-31
Attending: FAMILY MEDICINE
Payer: COMMERCIAL

## 2025-01-31 DIAGNOSIS — E11.9 DIABETES MELLITUS WITHOUT COMPLICATION: ICD-10-CM

## 2025-01-31 DIAGNOSIS — E78.00 PURE HYPERCHOLESTEROLEMIA: Primary | ICD-10-CM

## 2025-01-31 DIAGNOSIS — R79.1 ABNORMAL COAGULATION PROFILE: ICD-10-CM

## 2025-01-31 LAB
HEMOCCULT SP1 STL QL: NEGATIVE
HEMOCCULT SP2 STL QL: NEGATIVE
HEMOCCULT SP3 STL QL: NEGATIVE

## 2025-01-31 PROCEDURE — 82272 OCCULT BLD FECES 1-3 TESTS: CPT | Mod: 59

## 2025-01-31 PROCEDURE — 82272 OCCULT BLD FECES 1-3 TESTS: CPT

## 2025-04-22 ENCOUNTER — HOSPITAL ENCOUNTER (OUTPATIENT)
Dept: RADIOLOGY | Facility: HOSPITAL | Age: 65
Discharge: HOME | End: 2025-04-22
Attending: FAMILY MEDICINE
Payer: COMMERCIAL

## 2025-04-22 DIAGNOSIS — M25.511 RIGHT SHOULDER PAIN: ICD-10-CM

## 2025-04-22 PROCEDURE — 73030 X-RAY EXAM OF SHOULDER: CPT | Mod: TC,RT

## 2025-06-03 ENCOUNTER — HOSPITAL ENCOUNTER (OUTPATIENT)
Dept: RADIOLOGY | Facility: HOSPITAL | Age: 65
Discharge: HOME | End: 2025-06-03
Attending: HOSPITALIST
Payer: COMMERCIAL

## 2025-06-03 DIAGNOSIS — Z87.891 PERSONAL HISTORY OF SMOKING: ICD-10-CM

## 2025-06-03 PROCEDURE — 71271 CT THORAX LUNG CANCER SCR C-: CPT | Mod: TC
